# Patient Record
Sex: FEMALE | Race: WHITE | NOT HISPANIC OR LATINO | Employment: OTHER | URBAN - NONMETROPOLITAN AREA
[De-identification: names, ages, dates, MRNs, and addresses within clinical notes are randomized per-mention and may not be internally consistent; named-entity substitution may affect disease eponyms.]

---

## 2018-06-14 ENCOUNTER — OFFICE VISIT (OUTPATIENT)
Dept: OBSTETRICS AND GYNECOLOGY | Facility: CLINIC | Age: 48
End: 2018-06-14

## 2018-06-14 VITALS
SYSTOLIC BLOOD PRESSURE: 155 MMHG | HEIGHT: 65 IN | BODY MASS INDEX: 47.15 KG/M2 | WEIGHT: 283 LBS | DIASTOLIC BLOOD PRESSURE: 70 MMHG

## 2018-06-14 DIAGNOSIS — N85.2 ENLARGED UTERUS: Chronic | ICD-10-CM

## 2018-06-14 DIAGNOSIS — Z98.891 H/O CESAREAN SECTION: ICD-10-CM

## 2018-06-14 DIAGNOSIS — E66.01 MORBID OBESITY WITH BMI OF 40.0-44.9, ADULT (HCC): Chronic | ICD-10-CM

## 2018-06-14 DIAGNOSIS — N92.1 MENOMETRORRHAGIA: ICD-10-CM

## 2018-06-14 DIAGNOSIS — N80.9 ENDOMETRIOSIS: Primary | Chronic | ICD-10-CM

## 2018-06-14 PROCEDURE — 99204 OFFICE O/P NEW MOD 45 MIN: CPT | Performed by: OBSTETRICS & GYNECOLOGY

## 2018-06-14 RX ORDER — CITALOPRAM 40 MG/1
40 TABLET ORAL DAILY
COMMUNITY
Start: 2018-05-15

## 2018-06-14 RX ORDER — LOSARTAN POTASSIUM 50 MG/1
100 TABLET ORAL DAILY
COMMUNITY

## 2018-06-14 RX ORDER — ARIPIPRAZOLE 15 MG/1
15 TABLET ORAL DAILY
COMMUNITY
Start: 2018-05-15

## 2018-06-14 RX ORDER — ERGOCALCIFEROL 1.25 MG/1
50000 CAPSULE ORAL
COMMUNITY

## 2018-06-14 RX ORDER — CARISOPRODOL 350 MG/1
350 TABLET ORAL 3 TIMES DAILY PRN
COMMUNITY

## 2018-06-14 RX ORDER — CLONAZEPAM 0.5 MG/1
0.5 TABLET ORAL 2 TIMES DAILY PRN
COMMUNITY
End: 2018-10-25 | Stop reason: SDUPTHER

## 2018-06-14 RX ORDER — BUPROPION HYDROCHLORIDE 150 MG/1
150 TABLET, EXTENDED RELEASE ORAL 2 TIMES DAILY
Qty: 60 TABLET | Refills: 12 | Status: SHIPPED | OUTPATIENT
Start: 2018-06-14

## 2018-06-14 RX ORDER — LORATADINE 10 MG/1
10 TABLET ORAL DAILY
COMMUNITY
Start: 2018-03-14 | End: 2018-08-07

## 2018-06-14 RX ORDER — ESOMEPRAZOLE MAGNESIUM 40 MG/1
40 CAPSULE, DELAYED RELEASE ORAL 2 TIMES DAILY
COMMUNITY
End: 2019-05-21 | Stop reason: SDUPTHER

## 2018-06-14 RX ORDER — HYDROCODONE BITARTRATE AND ACETAMINOPHEN 10; 325 MG/1; MG/1
1 TABLET ORAL 3 TIMES DAILY PRN
COMMUNITY

## 2018-06-16 NOTE — PROGRESS NOTES
Rylie Palumbo is a 47 y.o. y/o female     Chief Complaint: Symptomatic endometriosis, metromenorrhagia, anemia, history of , lethargy, depression, and difficulty losing weight.    HPI: 47-year-old  with one prior  delivery, she has heavy and painful periods.  She has had a gastric ulcer in the past.  She has had to have a blood transfusion for blood loss secondary to a combination of the bleeding gastric ulcer and her extremely heavy periods.    Menarche at age 8.    Past medical history includes depression, thyroid nodule, endometriosis, morbid obesity, hypertension.    Past surgical history includes  section in , sinus surgery , cholecystectomy , hernia repair , carpal tunnel surgery in , rotator cuff repair .    In  she required transfusion of 5 units of blood secondary to a combination bleeding gastric ulcer and heavy menstrual periods    She has extremely heavy periods.  She has lots of pain with her periods and has pain that radiates down her legs.  She has two older sisters who have had hysterectomies.  She strongly desires definitive therapy with hysterectomy and BSO.    She has been told she has 14 nodules on her thyroid gland.  She is scheduled for a thyroid scan on 2018.    She complains of vaginal dryness and pain during intercourse.    Current medications include Celexa, Nexium, hydrocodone, somewhat, losartan, clonazepam, diltiazem, and Abilify.    She is .    She is disabled.    She does not currently smoke or use smokeless tobacco.  She quit smoking in .    Her mother  at age 65 of hyperthermia.  Her father  at age 63 of a heart attack.  She has a 62-year-old brother who has heart problems and is diabetic.  She has a 59 sister with diabetes with diabetes.  She has a 54-year-old sister with diabetes, lupus, and factor V deficiency.  There is no family history of breast cancer, uterine cancer, ovarian cancer, colon  cancer    She complains of being tired and weak with weight gain and low energy.    We discussed adding Wellbutrin, and she wishes to try this.  We discussed potential side effects of this medication.    We will schedule her for a pelvic ultrasound.    She strongly desires to be scheduled for hysterectomy with BSO.  She wishes to be scheduled for abdominal hysterectomy and bilateral salpingo-oophorectomy on July 27, 2018.    I spent 45 minutes in direct patient care with this patient.    Review of Systems   Constitutional: Positive for fatigue and unexpected weight change ( Weight gain). Negative for activity change, appetite change, chills, diaphoresis and fever.   Gastrointestinal: Positive for abdominal pain and constipation. Negative for diarrhea and nausea.   Genitourinary: Positive for dyspareunia, menstrual problem, pelvic pain and urgency. Negative for difficulty urinating, dysuria, vaginal bleeding, vaginal discharge and vaginal pain.   Neurological: Positive for headaches.   Psychiatric/Behavioral: Positive for dysphoric mood and sleep disturbance. The patient is nervous/anxious.    All other systems reviewed and are negative.     Breast ROS: negative    The following portions of the patient's history were reviewed and updated as appropriate: allergies, current medications, past family history, past medical history, past social history, past surgical history and problem list.    Allergies   Allergen Reactions   • Adhesive Tape Rash   • Other Rash     Metals,Surgical Glue          Current Outpatient Prescriptions:   •  ARIPiprazole (ABILIFY) 15 MG tablet, Take 15 mg by mouth Daily., Disp: , Rfl:   •  carisoprodol (SOMA) 350 MG tablet, Take 350 mg by mouth 4 (Four) Times a Day As Needed for Muscle Spasms., Disp: , Rfl:   •  citalopram (CeleXA) 40 MG tablet, Take 40 mg by mouth Daily., Disp: , Rfl:   •  clonazePAM (KlonoPIN) 0.5 MG tablet, Take 0.5 mg by mouth 2 (Two) Times a Day As Needed for Seizures.,  Disp: , Rfl:   •  esomeprazole (nexIUM) 40 MG capsule, Take 40 mg by mouth 2 (Two) Times a Day., Disp: , Rfl:   •  HYDROcodone-acetaminophen (NORCO)  MG per tablet, Take 1 tablet by mouth 3 (Three) Times a Day., Disp: , Rfl:   •  loratadine (CLARITIN) 10 MG tablet, Take 10 mg by mouth Daily., Disp: , Rfl:   •  losartan (COZAAR) 50 MG tablet, Take 50 mg by mouth Daily., Disp: , Rfl:   •  vitamin D (ERGOCALCIFEROL) 40632 units capsule capsule, Take 50,000 Units by mouth 1 (One) Time Per Week., Disp: , Rfl:   •  buPROPion SR (WELLBUTRIN SR) 150 MG 12 hr tablet, Take 1 tablet by mouth 2 (Two) Times a Day., Disp: 60 tablet, Rfl: 12     The patient has a family history of   Family History   Problem Relation Age of Onset   • Hypertension Father    • Diabetes Father    • Stroke Father    • Heart attack Father    • Diabetes Mother    • Malig Hyperthermia Mother    • Hypertension Mother    • Factor V Leiden deficiency Sister    • Lupus Sister    • Diabetes Sister         Past Medical History:   Diagnosis Date   • Anemia    • Anxiety    • Asthma    • Depression    • Endometriosis    • Enlarged uterus 2018   • H/O  section 2018   • Hypertension    • Menometrorrhagia 2018   • Morbid obesity with BMI of 40.0-44.9, adult 2018   • Ovarian cyst         OB History      Para Term  AB Living    3 1 1   2      SAB TAB Ectopic Molar Multiple Live Births    2                     Social History     Social History   • Marital status:      Spouse name: N/A   • Number of children: N/A   • Years of education: N/A     Occupational History   • Not on file.     Social History Main Topics   • Smoking status: Former Smoker     Quit date: 2012   • Smokeless tobacco: Never Used   • Alcohol use No   • Drug use: No   • Sexual activity: Not on file     Other Topics Concern   • Not on file     Social History Narrative   • No narrative on file        Past Surgical History:   Procedure Laterality  "Date   •  SECTION     • CHOLECYSTECTOMY      2009   • CUBITAL TUNNEL RELEASE Right 2008   • ENDOSCOPIC FUNCTIONAL SINUS SURGERY (FESS)  2009   • SHOULDER ROTATOR CUFF REPAIR Right 2016   • UMBILICAL HERNIA REPAIR      Laparscopic        Patient Active Problem List   Diagnosis   • Endometriosis   • H/O  section   • Morbid obesity with BMI of 40.0-44.9, adult   • Menometrorrhagia   • Enlarged uterus        Documented Vitals    18 1347   BP: 155/70   Weight: 128 kg (283 lb)   Height: 165.1 cm (65\")       Physical Exam   Constitutional: She is oriented to person, place, and time. No distress.   Morbidly obese white female weighing 283 pounds with BMI 47.1.   HENT:   Head: Normocephalic and atraumatic.   Eyes: Conjunctivae and EOM are normal. Pupils are equal, round, and reactive to light.   Neck: Normal range of motion. Neck supple. No JVD present. No tracheal deviation present. No thyromegaly present.   Cardiovascular: Normal rate, regular rhythm, normal heart sounds and intact distal pulses.  Exam reveals no gallop and no friction rub.    No murmur heard.  Pulmonary/Chest: Effort normal and breath sounds normal. No stridor. No respiratory distress. She has no wheezes. She has no rales. She exhibits no tenderness.   Abdominal: Soft. Bowel sounds are normal. She exhibits no distension and no mass. There is no tenderness. There is no rebound and no guarding. No hernia.   Musculoskeletal: Normal range of motion. She exhibits no edema, tenderness or deformity.   Lymphadenopathy:     She has no cervical adenopathy.   Neurological: She is alert and oriented to person, place, and time. She has normal reflexes. She displays normal reflexes. No cranial nerve deficit. She exhibits normal muscle tone. Coordination normal.   Skin: Skin is warm and dry. No rash noted. She is not diaphoretic. No erythema. No pallor.   Psychiatric: She has a normal mood and affect. Her behavior is normal. Judgment and " thought content normal.   Nursing note and vitals reviewed.       Assessment        Diagnosis Plan   1. Endometriosis     2. H/O  section     3. Morbid obesity with BMI of 40.0-44.9, adult     4. Menometrorrhagia     5. Enlarged uterus           Plan      1. Wellbutrin  mg by mouth twice a day.  2. Recommend vitamin D3 and B12 supplementation.  3. Pelvic ultrasound in [place].  4. Scheduled total abdominal hysterectomy and bilateral salpingo-oophorectomy for 2018.  5. Encouraged in diet and exercise.  6. Handouts on depression, hot flashes, exercise, and vitamin use.   7. Follow-up in 3 weeks.  Follow-up sooner as needed.            This document has been electronically signed by Orlando Starr MD on 2018 4:59 PM

## 2018-07-11 ENCOUNTER — OFFICE VISIT (OUTPATIENT)
Dept: OBSTETRICS AND GYNECOLOGY | Facility: CLINIC | Age: 48
End: 2018-07-11

## 2018-07-11 VITALS
SYSTOLIC BLOOD PRESSURE: 134 MMHG | DIASTOLIC BLOOD PRESSURE: 90 MMHG | BODY MASS INDEX: 46.98 KG/M2 | HEIGHT: 65 IN | WEIGHT: 282 LBS

## 2018-07-11 DIAGNOSIS — D25.0 INTRAMURAL, SUBMUCOUS, AND SUBSEROUS LEIOMYOMA OF UTERUS: Primary | ICD-10-CM

## 2018-07-11 DIAGNOSIS — N85.2 ENLARGED UTERUS: Chronic | ICD-10-CM

## 2018-07-11 DIAGNOSIS — N80.9 ENDOMETRIOSIS: Chronic | ICD-10-CM

## 2018-07-11 DIAGNOSIS — D25.1 INTRAMURAL, SUBMUCOUS, AND SUBSEROUS LEIOMYOMA OF UTERUS: Primary | ICD-10-CM

## 2018-07-11 DIAGNOSIS — Z98.891 H/O CESAREAN SECTION: ICD-10-CM

## 2018-07-11 DIAGNOSIS — D25.2 INTRAMURAL, SUBMUCOUS, AND SUBSEROUS LEIOMYOMA OF UTERUS: Primary | ICD-10-CM

## 2018-07-11 DIAGNOSIS — Z12.31 ENCOUNTER FOR SCREENING MAMMOGRAM FOR BREAST CANCER: ICD-10-CM

## 2018-07-11 DIAGNOSIS — E66.01 MORBID OBESITY WITH BMI OF 40.0-44.9, ADULT (HCC): Chronic | ICD-10-CM

## 2018-07-11 DIAGNOSIS — D50.0 IRON DEFICIENCY ANEMIA DUE TO CHRONIC BLOOD LOSS: Chronic | ICD-10-CM

## 2018-07-11 PROCEDURE — 99215 OFFICE O/P EST HI 40 MIN: CPT | Performed by: OBSTETRICS & GYNECOLOGY

## 2018-07-24 PROBLEM — D64.9 ANEMIA: Chronic | Status: ACTIVE | Noted: 2018-07-24

## 2018-07-24 PROBLEM — D25.1 INTRAMURAL, SUBMUCOUS, AND SUBSEROUS LEIOMYOMA OF UTERUS: Status: ACTIVE | Noted: 2018-07-24

## 2018-07-24 PROBLEM — D25.2 INTRAMURAL, SUBMUCOUS, AND SUBSEROUS LEIOMYOMA OF UTERUS: Status: ACTIVE | Noted: 2018-07-24

## 2018-07-24 PROBLEM — D25.0 INTRAMURAL, SUBMUCOUS, AND SUBSEROUS LEIOMYOMA OF UTERUS: Status: ACTIVE | Noted: 2018-07-24

## 2018-07-24 PROBLEM — D50.0 IRON DEFICIENCY ANEMIA DUE TO CHRONIC BLOOD LOSS: Status: ACTIVE | Noted: 2018-07-24

## 2018-07-24 RX ORDER — SODIUM CHLORIDE 0.9 % (FLUSH) 0.9 %
1-10 SYRINGE (ML) INJECTION AS NEEDED
Status: CANCELLED | OUTPATIENT
Start: 2018-08-10 | End: 2019-08-10

## 2018-07-24 RX ORDER — CELECOXIB 200 MG/1
400 CAPSULE ORAL ONCE
Status: CANCELLED | OUTPATIENT
Start: 2018-08-10 | End: 2018-08-10

## 2018-07-24 RX ORDER — CEFAZOLIN SODIUM IN 0.9 % NACL 3 G/100 ML
3 INTRAVENOUS SOLUTION, PIGGYBACK (ML) INTRAVENOUS ONCE
Status: CANCELLED | OUTPATIENT
Start: 2018-08-10 | End: 2018-08-10

## 2018-07-24 NOTE — PROGRESS NOTES
Rylie Palumbo is a 48 y.o. y/o female     Chief Complaint: Symptomatic fibroid uterus, endometriosis, metromenorrhagia, anemia, history of , lethargy, depression, and difficulty losing weight.     HPI: 48-year-old  with one prior  delivery, she has heavy and painful periods.  She has had a gastric ulcer in the past.  She has had to have a blood transfusion for blood loss secondary to a combination of the bleeding gastric ulcer and her extremely heavy periods.     Menarche at age 8.     Past medical history includes depression, thyroid nodule, endometriosis, morbid obesity, hypertension.     Past surgical history includes  section in , sinus surgery , cholecystectomy , hernia repair , carpal tunnel surgery in , rotator cuff repair .     In  she required transfusion of 5 units of blood secondary to a combination bleeding gastric ulcer and heavy menstrual periods     She has extremely heavy periods.  She has lots of pain with her periods and has pain that radiates down her legs.  She has two older sisters who have had hysterectomies.  She strongly desires definitive therapy with hysterectomy and BSO.     She has been told she has 14 nodules on her thyroid gland.  She is scheduled for a thyroid scan on 2018.     She complains of vaginal dryness and pain during intercourse.     Current medications include Celexa, Nexium, hydrocodone, somewhat, losartan, clonazepam, diltiazem, and Abilify.     She is .     She is disabled.     She does not currently smoke or use smokeless tobacco.  She quit smoking in .     Her mother  at age 65 of hyperthermia.  Her father  at age 63 of a heart attack.  She has a 62-year-old brother who has heart problems and is diabetic.  She has a 59 sister with diabetes with diabetes.  She has a 54-year-old sister with diabetes, lupus, and factor V deficiency.  There is no family history of breast cancer, uterine  cancer, ovarian cancer, colon cancer     She complains of being tired and weak with weight gain and low energy.     We discussed adding Wellbutrin, and she wishes to try this.  We discussed potential side effects of this medication.     Pelvic ultrasound June 19, 2018 shows an enlarged fibroid uterus.  A left lateral 2.7 cm fibroid appears to be subserosal and degenerating.  Endometrial thickness 5.1 mm.  Normal-appearing ovaries bilaterally     She strongly desires to be scheduled for hysterectomy with BSO.  She wishes to be scheduled for abdominal hysterectomy and bilateral salpingo-oophorectomy on August 3, 2018.  She voices understanding that her morbid obesity, prior abdominal surgery, and multiple medical problems significantly increase the risk of surgical complications, anesthesia complications, and complications with recovery.     I spent 45 minutes in direct patient care with this patient.    Review of Systems   Constitutional: Positive for fatigue. Negative for activity change, appetite change, chills, diaphoresis, fever and unexpected weight change.   Gastrointestinal: Positive for abdominal pain and constipation. Negative for diarrhea and nausea.   Genitourinary: Positive for dyspareunia and menstrual problem. Negative for difficulty urinating, dysuria, pelvic pain, urgency, vaginal bleeding, vaginal discharge and vaginal pain.   Neurological: Positive for headaches.   Psychiatric/Behavioral: Positive for dysphoric mood and sleep disturbance. The patient is nervous/anxious.    All other systems reviewed and are negative.     Breast ROS: negative    The following portions of the patient's history were reviewed and updated as appropriate: allergies, current medications, past family history, past medical history, past social history, past surgical history and problem list.    Allergies   Allergen Reactions   • Adhesive Tape Rash   • Other Rash     Metals,Surgical Glue          Current Outpatient  Prescriptions:   •  ARIPiprazole (ABILIFY) 15 MG tablet, Take 15 mg by mouth Daily., Disp: , Rfl:   •  buPROPion SR (WELLBUTRIN SR) 150 MG 12 hr tablet, Take 1 tablet by mouth 2 (Two) Times a Day., Disp: 60 tablet, Rfl: 12  •  carisoprodol (SOMA) 350 MG tablet, Take 350 mg by mouth 4 (Four) Times a Day As Needed for Muscle Spasms., Disp: , Rfl:   •  citalopram (CeleXA) 40 MG tablet, Take 40 mg by mouth Daily., Disp: , Rfl:   •  clonazePAM (KlonoPIN) 0.5 MG tablet, Take 0.5 mg by mouth 2 (Two) Times a Day As Needed for Seizures., Disp: , Rfl:   •  esomeprazole (nexIUM) 40 MG capsule, Take 40 mg by mouth 2 (Two) Times a Day., Disp: , Rfl:   •  HYDROcodone-acetaminophen (NORCO)  MG per tablet, Take 1 tablet by mouth 3 (Three) Times a Day., Disp: , Rfl:   •  loratadine (CLARITIN) 10 MG tablet, Take 10 mg by mouth Daily., Disp: , Rfl:   •  losartan (COZAAR) 50 MG tablet, Take 50 mg by mouth Daily., Disp: , Rfl:   •  vitamin D (ERGOCALCIFEROL) 28471 units capsule capsule, Take 50,000 Units by mouth 1 (One) Time Per Week., Disp: , Rfl:      The patient has a family history of   Family History   Problem Relation Age of Onset   • Hypertension Father    • Diabetes Father    • Stroke Father    • Heart attack Father    • Diabetes Mother    • Malig Hyperthermia Mother    • Hypertension Mother    • Factor V Leiden deficiency Sister    • Lupus Sister    • Diabetes Sister         Past Medical History:   Diagnosis Date   • Anemia    • Anxiety    • Asthma    • Depression    • Endometriosis    • Enlarged uterus 2018   • H/O  section 2018   • Hypertension    • Intramural, submucous, and subserous leiomyoma of uterus 2018   • Menometrorrhagia 2018   • Morbid obesity with BMI of 40.0-44.9, adult (CMS/HCC) 2018   • Ovarian cyst         OB History      Para Term  AB Living    3 1 1   2      SAB TAB Ectopic Molar Multiple Live Births    2                     Social History     Social  "History   • Marital status:      Spouse name: N/A   • Number of children: N/A   • Years of education: N/A     Occupational History   • Not on file.     Social History Main Topics   • Smoking status: Former Smoker     Quit date: 2012   • Smokeless tobacco: Never Used   • Alcohol use No   • Drug use: No   • Sexual activity: Not on file     Other Topics Concern   • Not on file     Social History Narrative   • No narrative on file        Past Surgical History:   Procedure Laterality Date   •  SECTION     • CHOLECYSTECTOMY      2009   • CUBITAL TUNNEL RELEASE Right 2008   • ENDOSCOPIC FUNCTIONAL SINUS SURGERY (FESS)  2009   • SHOULDER ROTATOR CUFF REPAIR Right 2016   • UMBILICAL HERNIA REPAIR      Laparscopic        Patient Active Problem List   Diagnosis   • Endometriosis   • H/O  section   • Morbid obesity with BMI of 40.0-44.9, adult (CMS/[facility])   • Menometrorrhagia   • Enlarged uterus   • Intramural, submucous, and subserous leiomyoma of uterus   • Anemia        Documented Vitals    18 1441   BP: 134/90   Weight: 128 kg (282 lb)   Height: 165.1 cm (65\")       Physical Exam   Constitutional: She is oriented to person, place, and time. No distress.   Morbidly obese white female weighing 282 pounds with BMI 46.9.   HENT:   Head: Normocephalic and atraumatic.   Eyes: Pupils are equal, round, and reactive to light. Conjunctivae and EOM are normal.   Neck: Normal range of motion. Neck supple. No JVD present. No tracheal deviation present. No thyromegaly present.   Cardiovascular: Normal rate, regular rhythm, normal heart sounds and intact distal pulses.  Exam reveals no gallop and no friction rub.    No murmur heard.  Pulmonary/Chest: Effort normal and breath sounds normal. No stridor. No respiratory distress. She has no wheezes. She has no rales. She exhibits no tenderness.   Abdominal: Soft. Bowel sounds are normal. She exhibits no distension and no mass. There is no tenderness. There " is no rebound and no guarding. No hernia. Hernia confirmed negative in the right inguinal area and confirmed negative in the left inguinal area.   Genitourinary: Rectal exam shows no external hemorrhoid, no internal hemorrhoid, no fissure, no mass, no tenderness, anal tone normal and guaiac negative stool. No labial fusion. There is no rash, tenderness, lesion or injury on the right labia. There is no rash, tenderness, lesion or injury on the left labia. Uterus is enlarged and tender. Cervix exhibits no motion tenderness, no discharge and no friability. Right adnexum displays no mass, no tenderness and no fullness. Left adnexum displays no mass, no tenderness and no fullness. No erythema, tenderness or bleeding in the vagina. No foreign body in the vagina. No signs of injury around the vagina. No vaginal discharge found.   Genitourinary Comments: Enlarged, irregularly shaped, and tender uterus.  Uterosacral nodularity bilaterally.  No uterine descent.   Musculoskeletal: Normal range of motion. She exhibits no edema, tenderness or deformity.   Lymphadenopathy:     She has no cervical adenopathy.        Right: No inguinal adenopathy present.        Left: No inguinal adenopathy present.   Neurological: She is alert and oriented to person, place, and time. She has normal reflexes. She displays normal reflexes. No cranial nerve deficit. She exhibits normal muscle tone. Coordination normal.   Skin: Skin is warm and dry. No rash noted. She is not diaphoretic. No erythema. No pallor.   Psychiatric: She has a normal mood and affect. Her behavior is normal. Judgment and thought content normal.   Nursing note and vitals reviewed.    She was consented for ABDOMINAL HYSTERECTOMY WITH BILATERAL SALPINGO-OPHORECTOMY AND POSSIBLE APPENDECTOMY  We discussed the risk of no surgery to include no improvement and possible, although unlikely, undiagnosed malignancy.  The risks that were discussed included, but were not limited to:    1.  Bleeding with possible risk of blood transfusion. Blood products carry risk of HIV, infection, hepatitis, and transfusion reaction.    2. Infection requiring a antibiotic therapy.    3. Damage to internal organs such as bowel, bladder, blood vessels, or a hole(fistula) bladder and vagina or rectum and vagina requiring further surgical repair.    4. Anesthetic risk to include death.    5. Intolerance to hormone replacement therapy with risk of osteoporosis and cardiovascular disease.    6. Risk of postsurgical depression or sexual dysfunction after removal of pelvic organs.    7. Small risk for deep vein thrombosis were all explained.     8. The small risk for reoperation in the event of unanticipated bleeding or surgical injury was discussed.     9. Risk of continued pain.   10. Risk of malignancy with possible need for further surgery and/or therapy.  11. Death.   She voices understanding that her morbid obesity, prior abdominal surgery, and multiple medical problems significantly increase the risk of surgical complications, anesthesia complications, and complications with recovery.  All of her questions were answered fully. She left with a very clear understanding of the preoperative surgical indications and the nature of the surgery for which she is scheduled. She understands to be NPO after midnight.          Assessment        Diagnosis Plan   1. Intramural, submucous, and subserous leiomyoma of uterus     2. Endometriosis     3. H/O  section     4. Iron deficiency anemia due to chronic blood loss     5. Morbid obesity with BMI of 40.0-44.9, adult (CMS/HCC)     6. Enlarged uterus     7. Encounter for screening mammogram for breast cancer  Mammo Screening Digital Tomosynthesis Bilateral With CAD         Plan      1. Scheduled abdominal hysterectomy and bilateral salpingo-oophorectomy and possible appendectomy for August 3, 2018.  2. Schedule preadmission testing.            This document has been  electronically signed by Orlando Starr MD on July 24, 2018 8:06 AM

## 2018-07-24 NOTE — H&P
SURGICAL HISTORY AND PHYSICAL    Rylie Palumbo is a 48 y.o. y/o female     Chief Complaint: Symptomatic fibroid uterus, endometriosis, metromenorrhagia, anemia, history of , lethargy, depression, and difficulty losing weight.     HPI: 48-year-old  with one prior  delivery, she has heavy and painful periods.  She has had a gastric ulcer in the past.  She has had to have a blood transfusion for blood loss secondary to a combination of the bleeding gastric ulcer and her extremely heavy periods.     Menarche at age 8.     Past medical history includes depression, thyroid nodule, endometriosis, morbid obesity, hypertension.     Past surgical history includes  section in , sinus surgery , cholecystectomy , hernia repair , carpal tunnel surgery in , rotator cuff repair .     In  she required transfusion of 5 units of blood secondary to a combination bleeding gastric ulcer and heavy menstrual periods     She has extremely heavy periods.  She has lots of pain with her periods and has pain that radiates down her legs.  She has two older sisters who have had hysterectomies.  She strongly desires definitive therapy with hysterectomy and BSO.     She has been told she has 14 nodules on her thyroid gland.  She is scheduled for a thyroid scan on 2018.     She complains of vaginal dryness and pain during intercourse.     Current medications include Celexa, Nexium, hydrocodone, somewhat, losartan, clonazepam, diltiazem, and Abilify.     She is .     She is disabled.     She does not currently smoke or use smokeless tobacco.  She quit smoking in .     Her mother  at age 65 of hyperthermia.  Her father  at age 63 of a heart attack.  She has a 62-year-old brother who has heart problems and is diabetic.  She has a 59 sister with diabetes with diabetes.  She has a 54-year-old sister with diabetes, lupus, and factor V deficiency.  There is no family  history of breast cancer, uterine cancer, ovarian cancer, colon cancer     She complains of being tired and weak with weight gain and low energy.     We discussed adding Wellbutrin, and she wishes to try this.  We discussed potential side effects of this medication.     Pelvic ultrasound June 19, 2018 shows an enlarged fibroid uterus.  A left lateral 2.7 cm fibroid appears to be subserosal and degenerating.  Endometrial thickness 5.1 mm.  Normal-appearing ovaries bilaterally     She strongly desires to be scheduled for hysterectomy with BSO.  She wishes to be scheduled for abdominal hysterectomy and bilateral salpingo-oophorectomy on August 3, 2018.  She voices understanding that her morbid obesity, prior abdominal surgery, and multiple medical problems significantly increase the risk of surgical complications, anesthesia complications, and complications with recovery.     I spent 45 minutes in direct patient care with this patient.    Review of Systems   Constitutional: Positive for fatigue. Negative for activity change, appetite change, chills, diaphoresis, fever and unexpected weight change.   Gastrointestinal: Positive for abdominal pain and constipation. Negative for diarrhea and nausea.   Genitourinary: Positive for dyspareunia and menstrual problem. Negative for difficulty urinating, dysuria, pelvic pain, urgency, vaginal bleeding, vaginal discharge and vaginal pain.   Neurological: Positive for headaches.   Psychiatric/Behavioral: Positive for dysphoric mood and sleep disturbance. The patient is nervous/anxious.    All other systems reviewed and are negative.     Breast ROS: negative    The following portions of the patient's history were reviewed and updated as appropriate: allergies, current medications, past family history, past medical history, past social history, past surgical history and problem list.    Allergies   Allergen Reactions   • Adhesive Tape Rash   • Other Rash     Metals,Surgical Glue           Current Outpatient Prescriptions:   •  ARIPiprazole (ABILIFY) 15 MG tablet, Take 15 mg by mouth Daily., Disp: , Rfl:   •  buPROPion SR (WELLBUTRIN SR) 150 MG 12 hr tablet, Take 1 tablet by mouth 2 (Two) Times a Day., Disp: 60 tablet, Rfl: 12  •  carisoprodol (SOMA) 350 MG tablet, Take 350 mg by mouth 4 (Four) Times a Day As Needed for Muscle Spasms., Disp: , Rfl:   •  citalopram (CeleXA) 40 MG tablet, Take 40 mg by mouth Daily., Disp: , Rfl:   •  clonazePAM (KlonoPIN) 0.5 MG tablet, Take 0.5 mg by mouth 2 (Two) Times a Day As Needed for Seizures., Disp: , Rfl:   •  esomeprazole (nexIUM) 40 MG capsule, Take 40 mg by mouth 2 (Two) Times a Day., Disp: , Rfl:   •  HYDROcodone-acetaminophen (NORCO)  MG per tablet, Take 1 tablet by mouth 3 (Three) Times a Day., Disp: , Rfl:   •  loratadine (CLARITIN) 10 MG tablet, Take 10 mg by mouth Daily., Disp: , Rfl:   •  losartan (COZAAR) 50 MG tablet, Take 50 mg by mouth Daily., Disp: , Rfl:   •  vitamin D (ERGOCALCIFEROL) 61536 units capsule capsule, Take 50,000 Units by mouth 1 (One) Time Per Week., Disp: , Rfl:      The patient has a family history of   Family History   Problem Relation Age of Onset   • Hypertension Father    • Diabetes Father    • Stroke Father    • Heart attack Father    • Diabetes Mother    • Malig Hyperthermia Mother    • Hypertension Mother    • Factor V Leiden deficiency Sister    • Lupus Sister    • Diabetes Sister         Past Medical History:   Diagnosis Date   • Anemia    • Anxiety    • Asthma    • Depression    • Endometriosis    • Enlarged uterus 2018   • H/O  section 2018   • Hypertension    • Intramural, submucous, and subserous leiomyoma of uterus 2018   • Menometrorrhagia 2018   • Morbid obesity with BMI of 40.0-44.9, adult (CMS/[facility]) 2018   • Ovarian cyst         OB History      Para Term  AB Living    3 1 1   2      SAB TAB Ectopic Molar Multiple Live Births    2                  "    Social History     Social History   • Marital status:      Spouse name: N/A   • Number of children: N/A   • Years of education: N/A     Occupational History   • Not on file.     Social History Main Topics   • Smoking status: Former Smoker     Quit date: 2012   • Smokeless tobacco: Never Used   • Alcohol use No   • Drug use: No   • Sexual activity: Not on file     Other Topics Concern   • Not on file     Social History Narrative   • No narrative on file        Past Surgical History:   Procedure Laterality Date   •  SECTION     • CHOLECYSTECTOMY      2009   • CUBITAL TUNNEL RELEASE Right 2008   • ENDOSCOPIC FUNCTIONAL SINUS SURGERY (FESS)  2009   • SHOULDER ROTATOR CUFF REPAIR Right 2016   • UMBILICAL HERNIA REPAIR      Laparscopic        Patient Active Problem List   Diagnosis   • Endometriosis   • H/O  section   • Morbid obesity with BMI of 40.0-44.9, adult (CMS/[facility])   • Menometrorrhagia   • Enlarged uterus   • Intramural, submucous, and subserous leiomyoma of uterus   • Anemia        Documented Vitals    18 1441   BP: 134/90   Weight: 128 kg (282 lb)   Height: 165.1 cm (65\")       Physical Exam   Constitutional: She is oriented to person, place, and time. No distress.   Morbidly obese white female weighing 282 pounds with BMI 46.9.   HENT:   Head: Normocephalic and atraumatic.   Eyes: Pupils are equal, round, and reactive to light. Conjunctivae and EOM are normal.   Neck: Normal range of motion. Neck supple. No JVD present. No tracheal deviation present. No thyromegaly present.   Cardiovascular: Normal rate, regular rhythm, normal heart sounds and intact distal pulses.  Exam reveals no gallop and no friction rub.    No murmur heard.  Pulmonary/Chest: Effort normal and breath sounds normal. No stridor. No respiratory distress. She has no wheezes. She has no rales. She exhibits no tenderness.   Abdominal: Soft. Bowel sounds are normal. She exhibits no distension and no mass. " There is no tenderness. There is no rebound and no guarding. No hernia. Hernia confirmed negative in the right inguinal area and confirmed negative in the left inguinal area.   Genitourinary: Rectal exam shows no external hemorrhoid, no internal hemorrhoid, no fissure, no mass, no tenderness, anal tone normal and guaiac negative stool. No labial fusion. There is no rash, tenderness, lesion or injury on the right labia. There is no rash, tenderness, lesion or injury on the left labia. Uterus is enlarged and tender. Cervix exhibits no motion tenderness, no discharge and no friability. Right adnexum displays no mass, no tenderness and no fullness. Left adnexum displays no mass, no tenderness and no fullness. No erythema, tenderness or bleeding in the vagina. No foreign body in the vagina. No signs of injury around the vagina. No vaginal discharge found.   Genitourinary Comments: Enlarged, irregularly shaped, and tender uterus.  Uterosacral nodularity bilaterally.  No uterine descent.   Musculoskeletal: Normal range of motion. She exhibits no edema, tenderness or deformity.   Lymphadenopathy:     She has no cervical adenopathy.        Right: No inguinal adenopathy present.        Left: No inguinal adenopathy present.   Neurological: She is alert and oriented to person, place, and time. She has normal reflexes. She displays normal reflexes. No cranial nerve deficit. She exhibits normal muscle tone. Coordination normal.   Skin: Skin is warm and dry. No rash noted. She is not diaphoretic. No erythema. No pallor.   Psychiatric: She has a normal mood and affect. Her behavior is normal. Judgment and thought content normal.   Nursing note and vitals reviewed.    She was consented for ABDOMINAL HYSTERECTOMY WITH BILATERAL SALPINGO-OPHORECTOMY AND POSSIBLE APPENDECTOMY  We discussed the risk of no surgery to include no improvement and possible, although unlikely, undiagnosed malignancy.  The risks that were discussed included,  but were not limited to:    1. Bleeding with possible risk of blood transfusion. Blood products carry risk of HIV, infection, hepatitis, and transfusion reaction.    2. Infection requiring a antibiotic therapy.    3. Damage to internal organs such as bowel, bladder, blood vessels, or a hole(fistula) bladder and vagina or rectum and vagina requiring further surgical repair.    4. Anesthetic risk to include death.    5. Intolerance to hormone replacement therapy with risk of osteoporosis and cardiovascular disease.    6. Risk of postsurgical depression or sexual dysfunction after removal of pelvic organs.    7. Small risk for deep vein thrombosis were all explained.     8. The small risk for reoperation in the event of unanticipated bleeding or surgical injury was discussed.     9. Risk of continued pain.   10. Risk of malignancy with possible need for further surgery and/or therapy.  11. Death.   She voices understanding that her morbid obesity, prior abdominal surgery, and multiple medical problems significantly increase the risk of surgical complications, anesthesia complications, and complications with recovery.  All of her questions were answered fully. She left with a very clear understanding of the preoperative surgical indications and the nature of the surgery for which she is scheduled. She understands to be NPO after midnight.          Assessment        Diagnosis Plan   1. Intramural, submucous, and subserous leiomyoma of uterus     2. Endometriosis     3. H/O  section     4. Iron deficiency anemia due to chronic blood loss     5. Morbid obesity with BMI of 40.0-44.9, adult (CMS/HCC)     6. Enlarged uterus     7. Encounter for screening mammogram for breast cancer  Mammo Screening Digital Tomosynthesis Bilateral With CAD         Plan      1. Scheduled abdominal hysterectomy and bilateral salpingo-oophorectomy and possible appendectomy for August 3, 2018.  2. Schedule preadmission  testing.            This document has been electronically signed by Orlando Starr MD on July 24, 2018 8:06 AM

## 2018-08-07 ENCOUNTER — APPOINTMENT (OUTPATIENT)
Dept: PREADMISSION TESTING | Facility: HOSPITAL | Age: 48
End: 2018-08-07

## 2018-08-07 VITALS
WEIGHT: 279 LBS | BODY MASS INDEX: 46.48 KG/M2 | RESPIRATION RATE: 20 BRPM | HEIGHT: 65 IN | HEART RATE: 76 BPM | OXYGEN SATURATION: 98 % | DIASTOLIC BLOOD PRESSURE: 78 MMHG | SYSTOLIC BLOOD PRESSURE: 162 MMHG

## 2018-08-07 DIAGNOSIS — D50.0 IRON DEFICIENCY ANEMIA DUE TO CHRONIC BLOOD LOSS: ICD-10-CM

## 2018-08-07 DIAGNOSIS — N80.9 ENDOMETRIOSIS: ICD-10-CM

## 2018-08-07 DIAGNOSIS — D25.1 INTRAMURAL, SUBMUCOUS, AND SUBSEROUS LEIOMYOMA OF UTERUS: ICD-10-CM

## 2018-08-07 DIAGNOSIS — E66.01 MORBID OBESITY WITH BMI OF 40.0-44.9, ADULT (HCC): ICD-10-CM

## 2018-08-07 DIAGNOSIS — D25.2 INTRAMURAL, SUBMUCOUS, AND SUBSEROUS LEIOMYOMA OF UTERUS: ICD-10-CM

## 2018-08-07 DIAGNOSIS — Z98.891 H/O CESAREAN SECTION: ICD-10-CM

## 2018-08-07 DIAGNOSIS — D25.0 INTRAMURAL, SUBMUCOUS, AND SUBSEROUS LEIOMYOMA OF UTERUS: ICD-10-CM

## 2018-08-07 LAB
ABO GROUP BLD: NORMAL
ANISOCYTOSIS BLD QL: NORMAL
BACTERIA UR QL AUTO: ABNORMAL /HPF
BASOPHILS # BLD AUTO: 0.02 10*3/MM3 (ref 0–0.2)
BASOPHILS NFR BLD AUTO: 0.3 % (ref 0–2)
BILIRUB UR QL STRIP: NEGATIVE
BLD GP AB SCN SERPL QL: NEGATIVE
CLARITY UR: ABNORMAL
COLOR UR: YELLOW
DEPRECATED RDW RBC AUTO: 50.7 FL (ref 36.4–46.3)
EOSINOPHIL # BLD AUTO: 0.44 10*3/MM3 (ref 0–0.7)
EOSINOPHIL NFR BLD AUTO: 6.5 % (ref 0–7)
ERYTHROCYTE [DISTWIDTH] IN BLOOD BY AUTOMATED COUNT: 19.4 % (ref 11.5–14.5)
GLUCOSE UR STRIP-MCNC: NEGATIVE MG/DL
HCT VFR BLD AUTO: 40.3 % (ref 35–45)
HGB BLD-MCNC: 12.4 G/DL (ref 12–15.5)
HGB UR QL STRIP.AUTO: NEGATIVE
HYALINE CASTS UR QL AUTO: ABNORMAL /LPF
IMM GRANULOCYTES # BLD: 0.02 10*3/MM3 (ref 0–0.02)
IMM GRANULOCYTES NFR BLD: 0.3 % (ref 0–0.5)
KETONES UR QL STRIP: NEGATIVE
LEUKOCYTE ESTERASE UR QL STRIP.AUTO: ABNORMAL
LYMPHOCYTES # BLD AUTO: 1.27 10*3/MM3 (ref 0.6–4.2)
LYMPHOCYTES NFR BLD AUTO: 18.8 % (ref 10–50)
Lab: NORMAL
MCH RBC QN AUTO: 21.9 PG (ref 26.5–34)
MCHC RBC AUTO-ENTMCNC: 30.8 G/DL (ref 31.4–36)
MCV RBC AUTO: 71.2 FL (ref 80–98)
MICROCYTES BLD QL: NORMAL
MONOCYTES # BLD AUTO: 0.36 10*3/MM3 (ref 0–0.9)
MONOCYTES NFR BLD AUTO: 5.3 % (ref 0–12)
NEUTROPHILS # BLD AUTO: 4.64 10*3/MM3 (ref 2–8.6)
NEUTROPHILS NFR BLD AUTO: 68.8 % (ref 37–80)
NITRITE UR QL STRIP: NEGATIVE
OVALOCYTES BLD QL SMEAR: NORMAL
PH UR STRIP.AUTO: 5.5 [PH] (ref 5–9)
PLAT MORPH BLD: NORMAL
PLATELET # BLD AUTO: 158 10*3/MM3 (ref 150–450)
PMV BLD AUTO: ABNORMAL FL (ref 8–12)
POLYCHROMASIA BLD QL SMEAR: NORMAL
PROT UR QL STRIP: NEGATIVE
RBC # BLD AUTO: 5.66 10*6/MM3 (ref 3.77–5.16)
RBC # UR: ABNORMAL /HPF
REF LAB TEST METHOD: ABNORMAL
RH BLD: POSITIVE
SP GR UR STRIP: 1.01 (ref 1–1.03)
SQUAMOUS #/AREA URNS HPF: ABNORMAL /HPF
T&S EXPIRATION DATE: NORMAL
UROBILINOGEN UR QL STRIP: ABNORMAL
WBC MORPH BLD: NORMAL
WBC NRBC COR # BLD: 6.75 10*3/MM3 (ref 3.2–9.8)
WBC UR QL AUTO: ABNORMAL /HPF

## 2018-08-07 PROCEDURE — 36415 COLL VENOUS BLD VENIPUNCTURE: CPT

## 2018-08-07 PROCEDURE — 86900 BLOOD TYPING SEROLOGIC ABO: CPT | Performed by: OBSTETRICS & GYNECOLOGY

## 2018-08-07 PROCEDURE — 87086 URINE CULTURE/COLONY COUNT: CPT | Performed by: OBSTETRICS & GYNECOLOGY

## 2018-08-07 PROCEDURE — 85007 BL SMEAR W/DIFF WBC COUNT: CPT | Performed by: OBSTETRICS & GYNECOLOGY

## 2018-08-07 PROCEDURE — 81001 URINALYSIS AUTO W/SCOPE: CPT | Performed by: OBSTETRICS & GYNECOLOGY

## 2018-08-07 PROCEDURE — 93005 ELECTROCARDIOGRAM TRACING: CPT

## 2018-08-07 PROCEDURE — 86901 BLOOD TYPING SEROLOGIC RH(D): CPT | Performed by: OBSTETRICS & GYNECOLOGY

## 2018-08-07 PROCEDURE — 93010 ELECTROCARDIOGRAM REPORT: CPT | Performed by: INTERNAL MEDICINE

## 2018-08-07 PROCEDURE — 86850 RBC ANTIBODY SCREEN: CPT | Performed by: OBSTETRICS & GYNECOLOGY

## 2018-08-07 PROCEDURE — 85025 COMPLETE CBC W/AUTO DIFF WBC: CPT | Performed by: OBSTETRICS & GYNECOLOGY

## 2018-08-07 RX ORDER — SODIUM CHLORIDE, SODIUM GLUCONATE, SODIUM ACETATE, POTASSIUM CHLORIDE, AND MAGNESIUM CHLORIDE 526; 502; 368; 37; 30 MG/100ML; MG/100ML; MG/100ML; MG/100ML; MG/100ML
1000 INJECTION, SOLUTION INTRAVENOUS CONTINUOUS
Status: CANCELLED | OUTPATIENT
Start: 2018-08-10

## 2018-08-07 RX ORDER — FERROUS SULFATE TAB EC 324 MG (65 MG FE EQUIVALENT) 324 (65 FE) MG
324 TABLET DELAYED RESPONSE ORAL
COMMUNITY

## 2018-08-07 NOTE — DISCHARGE INSTRUCTIONS
[facility]  Pre-op Information and Guidelines    You will be called after 2 p.m. the day before your surgery (Friday for Monday surgery) and notified of your time for arrival and approximate surgery time.  If you have not received a call by 4P.M., please contact Same Day Surgery at [phone] of if outside [facility] call [phone].    Please Follow these Important Safety Guidelines:    • The morning of your procedure, take only the medications listed below with   A sip of water:_____________________________________________       ______________________________________________    • DO NOT eat or drink anything after 12:00 midnight the night before surgery  Specific instructions concerning drinking clear liquids will be discussed during  the pre-surgery instruction call the day before your surgery.    • If you take a blood thinner (ex. Plavix, Coumadin, aspirin), ask your doctor when to stop it before surgery  STOP DATE: _________________    • Only 2 visitors are allowed in patient rooms at a time  Your visitors will be asked to wait in the lobby until the admission process is complete with the exception of a parent with a child and patients in need of special assistance.    • YOU CANNOT DRIVE YOURSELF HOME  You must be accompanied by someone who will be responsible for driving you home after surgery and for your care at home.    • DO NOT chew gum, use breath mints, hard candy, or smoke the day of surgery  • DO NOT drink alcohol for at least 24 hours before your surgery  • DO NOT wear any jewelry and remove all body piercing before coming to the hospital  • DO NOT wear make-up to the hospital  • If you are having surgery on an extremity (arm/leg/foot) remove nail polish/artificial nails on the surgical side  • Clothing, glasses, contacts, dentures, and hairpieces must be removed before surgery  • Bathe the night before or the morning of your surgery and do not use powders/lotions on  skin.

## 2018-08-07 NOTE — PAT
Called Dr. Valladares chart flags patient as potential risk for malignant hyperthermia.  In md notes has patients mother as having hx of malignant hyperthermia, patient relates that her mother had her thyroid taken out and did not take her thyroid medicine and she was always cold, patient relates when her mother  they were told it was as if she  from hypothermia, patient unaware of any other issues with her mother.  Patient relates that the only problem she had with surgery is 1989 she ran fever for 4-5 days after a  and they thought it was infection related not surgery related according to patient.  No further orders or instructions at this time.  Chlorhexidine given with written and verbal instructions, reviewed with patient all questions answered, patient related that she understood information given.

## 2018-08-09 LAB — BACTERIA SPEC AEROBE CULT: NORMAL

## 2018-08-10 ENCOUNTER — ANESTHESIA (OUTPATIENT)
Dept: PERIOP | Facility: HOSPITAL | Age: 48
End: 2018-08-10

## 2018-08-10 ENCOUNTER — HOSPITAL ENCOUNTER (INPATIENT)
Facility: HOSPITAL | Age: 48
LOS: 2 days | Discharge: HOME | End: 2018-08-12
Attending: OBSTETRICS & GYNECOLOGY | Admitting: OBSTETRICS & GYNECOLOGY

## 2018-08-10 ENCOUNTER — ANESTHESIA EVENT (OUTPATIENT)
Dept: PERIOP | Facility: HOSPITAL | Age: 48
End: 2018-08-10

## 2018-08-10 DIAGNOSIS — Z98.891 H/O CESAREAN SECTION: ICD-10-CM

## 2018-08-10 DIAGNOSIS — G89.18 POSTOPERATIVE PAIN: Primary | ICD-10-CM

## 2018-08-10 DIAGNOSIS — E66.01 MORBID OBESITY WITH BMI OF 40.0-44.9, ADULT (HCC): ICD-10-CM

## 2018-08-10 DIAGNOSIS — N80.9 ENDOMETRIOSIS: ICD-10-CM

## 2018-08-10 DIAGNOSIS — D25.1 INTRAMURAL, SUBMUCOUS, AND SUBSEROUS LEIOMYOMA OF UTERUS: ICD-10-CM

## 2018-08-10 DIAGNOSIS — D25.2 INTRAMURAL, SUBMUCOUS, AND SUBSEROUS LEIOMYOMA OF UTERUS: ICD-10-CM

## 2018-08-10 DIAGNOSIS — D50.0 IRON DEFICIENCY ANEMIA DUE TO CHRONIC BLOOD LOSS: ICD-10-CM

## 2018-08-10 DIAGNOSIS — D25.0 INTRAMURAL, SUBMUCOUS, AND SUBSEROUS LEIOMYOMA OF UTERUS: ICD-10-CM

## 2018-08-10 LAB
ABO GROUP BLD: NORMAL
B-HCG UR QL: NEGATIVE
BLD GP AB SCN SERPL QL: NEGATIVE
Lab: NORMAL
RH BLD: POSITIVE
T&S EXPIRATION DATE: NORMAL

## 2018-08-10 PROCEDURE — 0UT70ZZ RESECTION OF BILATERAL FALLOPIAN TUBES, OPEN APPROACH: ICD-10-PCS | Performed by: OBSTETRICS & GYNECOLOGY

## 2018-08-10 PROCEDURE — 25010000002 SUCCINYLCHOLINE PER 20 MG: Performed by: NURSE ANESTHETIST, CERTIFIED REGISTERED

## 2018-08-10 PROCEDURE — 25010000002 ONDANSETRON PER 1 MG: Performed by: OBSTETRICS & GYNECOLOGY

## 2018-08-10 PROCEDURE — 25010000002 ESTRADIOL VALERATE PER 10 MG: Performed by: OBSTETRICS & GYNECOLOGY

## 2018-08-10 PROCEDURE — 81025 URINE PREGNANCY TEST: CPT | Performed by: OBSTETRICS & GYNECOLOGY

## 2018-08-10 PROCEDURE — 25010000002 METHOCARBAMOL 1000 MG/10ML SOLUTION 10 ML VIAL: Performed by: OBSTETRICS & GYNECOLOGY

## 2018-08-10 PROCEDURE — 0UT90ZZ RESECTION OF UTERUS, OPEN APPROACH: ICD-10-PCS | Performed by: OBSTETRICS & GYNECOLOGY

## 2018-08-10 PROCEDURE — 25010000002 TESTOSTERONE CYPIONATE 200 MG/ML SOLUTION: Performed by: OBSTETRICS & GYNECOLOGY

## 2018-08-10 PROCEDURE — 88307 TISSUE EXAM BY PATHOLOGIST: CPT | Performed by: PATHOLOGY

## 2018-08-10 PROCEDURE — 86923 COMPATIBILITY TEST ELECTRIC: CPT

## 2018-08-10 PROCEDURE — 86901 BLOOD TYPING SEROLOGIC RH(D): CPT | Performed by: OBSTETRICS & GYNECOLOGY

## 2018-08-10 PROCEDURE — 25010000002 FENTANYL CITRATE (PF) 100 MCG/2ML SOLUTION: Performed by: NURSE ANESTHETIST, CERTIFIED REGISTERED

## 2018-08-10 PROCEDURE — 25010000002 CEFAZOLIN PER 500 MG: Performed by: OBSTETRICS & GYNECOLOGY

## 2018-08-10 PROCEDURE — 94799 UNLISTED PULMONARY SVC/PX: CPT

## 2018-08-10 PROCEDURE — 25010000002 HYDROMORPHONE PER 4 MG: Performed by: NURSE ANESTHETIST, CERTIFIED REGISTERED

## 2018-08-10 PROCEDURE — 25010000002 ONDANSETRON PER 1 MG: Performed by: NURSE ANESTHETIST, CERTIFIED REGISTERED

## 2018-08-10 PROCEDURE — 86900 BLOOD TYPING SEROLOGIC ABO: CPT | Performed by: OBSTETRICS & GYNECOLOGY

## 2018-08-10 PROCEDURE — 25010000002 PROPOFOL 10 MG/ML EMULSION: Performed by: NURSE ANESTHETIST, CERTIFIED REGISTERED

## 2018-08-10 PROCEDURE — 25010000002 DEXAMETHASONE PER 1 MG: Performed by: NURSE ANESTHETIST, CERTIFIED REGISTERED

## 2018-08-10 PROCEDURE — 0UT20ZZ RESECTION OF BILATERAL OVARIES, OPEN APPROACH: ICD-10-PCS | Performed by: OBSTETRICS & GYNECOLOGY

## 2018-08-10 PROCEDURE — 25010000002 NEOSTIGMINE 4 MG/4ML SOLUTION PREFILLED SYRINGE: Performed by: NURSE ANESTHETIST, CERTIFIED REGISTERED

## 2018-08-10 PROCEDURE — 25010000002 MIDAZOLAM PER 1 MG: Performed by: NURSE ANESTHETIST, CERTIFIED REGISTERED

## 2018-08-10 PROCEDURE — 86850 RBC ANTIBODY SCREEN: CPT | Performed by: OBSTETRICS & GYNECOLOGY

## 2018-08-10 PROCEDURE — 25010000002 KETOROLAC TROMETHAMINE PER 15 MG: Performed by: OBSTETRICS & GYNECOLOGY

## 2018-08-10 PROCEDURE — 88307 TISSUE EXAM BY PATHOLOGIST: CPT | Performed by: OBSTETRICS & GYNECOLOGY

## 2018-08-10 PROCEDURE — 58150 TOTAL HYSTERECTOMY: CPT | Performed by: OBSTETRICS & GYNECOLOGY

## 2018-08-10 RX ORDER — ZOLPIDEM TARTRATE 5 MG/1
10 TABLET ORAL NIGHTLY PRN
Status: DISCONTINUED | OUTPATIENT
Start: 2018-08-10 | End: 2018-08-12 | Stop reason: HOSPADM

## 2018-08-10 RX ORDER — SODIUM CHLORIDE, SODIUM GLUCONATE, SODIUM ACETATE, POTASSIUM CHLORIDE, AND MAGNESIUM CHLORIDE 526; 502; 368; 37; 30 MG/100ML; MG/100ML; MG/100ML; MG/100ML; MG/100ML
1000 INJECTION, SOLUTION INTRAVENOUS CONTINUOUS
Status: ACTIVE | OUTPATIENT
Start: 2018-08-10 | End: 2018-08-11

## 2018-08-10 RX ORDER — BISACODYL 10 MG
10 SUPPOSITORY, RECTAL RECTAL DAILY PRN
Status: DISCONTINUED | OUTPATIENT
Start: 2018-08-10 | End: 2018-08-12 | Stop reason: HOSPADM

## 2018-08-10 RX ORDER — CLONAZEPAM 0.5 MG/1
0.5 TABLET ORAL 2 TIMES DAILY PRN
Status: DISCONTINUED | OUTPATIENT
Start: 2018-08-10 | End: 2018-08-12 | Stop reason: HOSPADM

## 2018-08-10 RX ORDER — DIPHENHYDRAMINE HYDROCHLORIDE 50 MG/ML
12.5 INJECTION INTRAMUSCULAR; INTRAVENOUS
Status: DISCONTINUED | OUTPATIENT
Start: 2018-08-10 | End: 2018-08-10 | Stop reason: HOSPADM

## 2018-08-10 RX ORDER — PANTOPRAZOLE SODIUM 40 MG/1
40 TABLET, DELAYED RELEASE ORAL EVERY MORNING
Status: DISCONTINUED | OUTPATIENT
Start: 2018-08-11 | End: 2018-08-12 | Stop reason: HOSPADM

## 2018-08-10 RX ORDER — IBUPROFEN 800 MG/1
800 TABLET ORAL EVERY 8 HOURS PRN
Qty: 60 TABLET | Refills: 12 | Status: SHIPPED | OUTPATIENT
Start: 2018-08-10

## 2018-08-10 RX ORDER — IBUPROFEN 800 MG/1
800 TABLET ORAL
Status: DISCONTINUED | OUTPATIENT
Start: 2018-08-11 | End: 2018-08-12 | Stop reason: HOSPADM

## 2018-08-10 RX ORDER — EPHEDRINE SULFATE 50 MG/ML
5 INJECTION, SOLUTION INTRAVENOUS ONCE AS NEEDED
Status: DISCONTINUED | OUTPATIENT
Start: 2018-08-10 | End: 2018-08-10 | Stop reason: HOSPADM

## 2018-08-10 RX ORDER — MIDAZOLAM HYDROCHLORIDE 1 MG/ML
INJECTION INTRAMUSCULAR; INTRAVENOUS AS NEEDED
Status: DISCONTINUED | OUTPATIENT
Start: 2018-08-10 | End: 2018-08-10 | Stop reason: SURG

## 2018-08-10 RX ORDER — NEOSTIGMINE METHYLSULFATE 4 MG/4 ML
SYRINGE (ML) INTRAVENOUS AS NEEDED
Status: DISCONTINUED | OUTPATIENT
Start: 2018-08-10 | End: 2018-08-10 | Stop reason: SURG

## 2018-08-10 RX ORDER — KETOROLAC TROMETHAMINE 30 MG/ML
60 INJECTION, SOLUTION INTRAMUSCULAR; INTRAVENOUS ONCE
Status: DISCONTINUED | OUTPATIENT
Start: 2018-08-11 | End: 2018-08-12 | Stop reason: HOSPADM

## 2018-08-10 RX ORDER — CEFAZOLIN SODIUM IN 0.9 % NACL 3 G/100 ML
3 INTRAVENOUS SOLUTION, PIGGYBACK (ML) INTRAVENOUS ONCE
Status: COMPLETED | OUTPATIENT
Start: 2018-08-10 | End: 2018-08-10

## 2018-08-10 RX ORDER — DEXAMETHASONE SODIUM PHOSPHATE 4 MG/ML
INJECTION, SOLUTION INTRA-ARTICULAR; INTRALESIONAL; INTRAMUSCULAR; INTRAVENOUS; SOFT TISSUE AS NEEDED
Status: DISCONTINUED | OUTPATIENT
Start: 2018-08-10 | End: 2018-08-10 | Stop reason: SURG

## 2018-08-10 RX ORDER — HYDROCODONE BITARTRATE AND ACETAMINOPHEN 5; 325 MG/1; MG/1
1 TABLET ORAL EVERY 4 HOURS PRN
Status: DISCONTINUED | OUTPATIENT
Start: 2018-08-10 | End: 2018-08-12 | Stop reason: HOSPADM

## 2018-08-10 RX ORDER — DEXTROSE AND SODIUM CHLORIDE 5; .45 G/100ML; G/100ML
125 INJECTION, SOLUTION INTRAVENOUS CONTINUOUS
Status: DISCONTINUED | OUTPATIENT
Start: 2018-08-10 | End: 2018-08-12

## 2018-08-10 RX ORDER — KETOROLAC TROMETHAMINE 30 MG/ML
30 INJECTION, SOLUTION INTRAMUSCULAR; INTRAVENOUS EVERY 6 HOURS
Status: DISPENSED | OUTPATIENT
Start: 2018-08-10 | End: 2018-08-11

## 2018-08-10 RX ORDER — ONDANSETRON 4 MG/1
4 TABLET, ORALLY DISINTEGRATING ORAL EVERY 6 HOURS PRN
Status: DISCONTINUED | OUTPATIENT
Start: 2018-08-10 | End: 2018-08-12 | Stop reason: HOSPADM

## 2018-08-10 RX ORDER — ONDANSETRON 2 MG/ML
INJECTION INTRAMUSCULAR; INTRAVENOUS AS NEEDED
Status: DISCONTINUED | OUTPATIENT
Start: 2018-08-10 | End: 2018-08-10 | Stop reason: SURG

## 2018-08-10 RX ORDER — DEXTROSE AND SODIUM CHLORIDE 5; .45 G/100ML; G/100ML
INJECTION, SOLUTION INTRAVENOUS
Status: COMPLETED
Start: 2018-08-10 | End: 2018-08-10

## 2018-08-10 RX ORDER — NALOXONE HCL 0.4 MG/ML
0.1 VIAL (ML) INJECTION
Status: DISCONTINUED | OUTPATIENT
Start: 2018-08-10 | End: 2018-08-12 | Stop reason: HOSPADM

## 2018-08-10 RX ORDER — GLYCOPYRROLATE 0.2 MG/ML
INJECTION INTRAMUSCULAR; INTRAVENOUS AS NEEDED
Status: DISCONTINUED | OUTPATIENT
Start: 2018-08-10 | End: 2018-08-10 | Stop reason: SURG

## 2018-08-10 RX ORDER — HYDROCODONE BITARTRATE AND ACETAMINOPHEN 10; 325 MG/1; MG/1
1 TABLET ORAL EVERY 4 HOURS PRN
Status: DISCONTINUED | OUTPATIENT
Start: 2018-08-10 | End: 2018-08-12 | Stop reason: HOSPADM

## 2018-08-10 RX ORDER — TESTOSTERONE CYPIONATE 200 MG/ML
100 INJECTION, SOLUTION INTRAMUSCULAR ONCE
Status: COMPLETED | OUTPATIENT
Start: 2018-08-10 | End: 2018-08-10

## 2018-08-10 RX ORDER — CITALOPRAM 40 MG/1
40 TABLET ORAL DAILY
Status: DISCONTINUED | OUTPATIENT
Start: 2018-08-10 | End: 2018-08-12 | Stop reason: HOSPADM

## 2018-08-10 RX ORDER — KETOROLAC TROMETHAMINE 30 MG/ML
60 INJECTION, SOLUTION INTRAMUSCULAR; INTRAVENOUS ONCE
Status: COMPLETED | OUTPATIENT
Start: 2018-08-10 | End: 2018-08-10

## 2018-08-10 RX ORDER — ROCURONIUM BROMIDE 10 MG/ML
INJECTION, SOLUTION INTRAVENOUS AS NEEDED
Status: DISCONTINUED | OUTPATIENT
Start: 2018-08-10 | End: 2018-08-10 | Stop reason: SURG

## 2018-08-10 RX ORDER — SUCCINYLCHOLINE CHLORIDE 20 MG/ML
INJECTION INTRAMUSCULAR; INTRAVENOUS AS NEEDED
Status: DISCONTINUED | OUTPATIENT
Start: 2018-08-10 | End: 2018-08-10 | Stop reason: SURG

## 2018-08-10 RX ORDER — BUPROPION HYDROCHLORIDE 150 MG/1
150 TABLET, EXTENDED RELEASE ORAL 2 TIMES DAILY
Status: DISCONTINUED | OUTPATIENT
Start: 2018-08-10 | End: 2018-08-12 | Stop reason: HOSPADM

## 2018-08-10 RX ORDER — ACETAMINOPHEN 325 MG/1
650 TABLET ORAL ONCE AS NEEDED
Status: DISCONTINUED | OUTPATIENT
Start: 2018-08-10 | End: 2018-08-10 | Stop reason: HOSPADM

## 2018-08-10 RX ORDER — ONDANSETRON 2 MG/ML
4 INJECTION INTRAMUSCULAR; INTRAVENOUS EVERY 6 HOURS PRN
Status: DISCONTINUED | OUTPATIENT
Start: 2018-08-10 | End: 2018-08-12 | Stop reason: HOSPADM

## 2018-08-10 RX ORDER — ACETAMINOPHEN 650 MG/1
650 SUPPOSITORY RECTAL ONCE AS NEEDED
Status: DISCONTINUED | OUTPATIENT
Start: 2018-08-10 | End: 2018-08-10 | Stop reason: HOSPADM

## 2018-08-10 RX ORDER — CELECOXIB 200 MG/1
400 CAPSULE ORAL ONCE
Status: COMPLETED | OUTPATIENT
Start: 2018-08-10 | End: 2018-08-10

## 2018-08-10 RX ORDER — SODIUM CHLORIDE 0.9 % (FLUSH) 0.9 %
1-10 SYRINGE (ML) INJECTION AS NEEDED
Status: DISCONTINUED | OUTPATIENT
Start: 2018-08-10 | End: 2018-08-10 | Stop reason: HOSPADM

## 2018-08-10 RX ORDER — SENNA AND DOCUSATE SODIUM 50; 8.6 MG/1; MG/1
2 TABLET, FILM COATED ORAL 2 TIMES DAILY PRN
Status: DISCONTINUED | OUTPATIENT
Start: 2018-08-10 | End: 2018-08-12 | Stop reason: HOSPADM

## 2018-08-10 RX ORDER — CEFAZOLIN SODIUM IN 0.9 % NACL 3 G/100 ML
3 INTRAVENOUS SOLUTION, PIGGYBACK (ML) INTRAVENOUS EVERY 8 HOURS
Status: COMPLETED | OUTPATIENT
Start: 2018-08-10 | End: 2018-08-11

## 2018-08-10 RX ORDER — FLUMAZENIL 0.1 MG/ML
0.2 INJECTION INTRAVENOUS AS NEEDED
Status: DISCONTINUED | OUTPATIENT
Start: 2018-08-10 | End: 2018-08-10 | Stop reason: HOSPADM

## 2018-08-10 RX ORDER — MEPERIDINE HYDROCHLORIDE 50 MG/ML
12.5 INJECTION INTRAMUSCULAR; INTRAVENOUS; SUBCUTANEOUS
Status: DISCONTINUED | OUTPATIENT
Start: 2018-08-10 | End: 2018-08-10 | Stop reason: HOSPADM

## 2018-08-10 RX ORDER — ARIPIPRAZOLE 15 MG/1
15 TABLET ORAL DAILY
Status: DISCONTINUED | OUTPATIENT
Start: 2018-08-10 | End: 2018-08-12 | Stop reason: HOSPADM

## 2018-08-10 RX ORDER — ONDANSETRON 2 MG/ML
4 INJECTION INTRAMUSCULAR; INTRAVENOUS ONCE AS NEEDED
Status: DISCONTINUED | OUTPATIENT
Start: 2018-08-10 | End: 2018-08-10 | Stop reason: HOSPADM

## 2018-08-10 RX ORDER — ONDANSETRON 4 MG/1
4 TABLET, FILM COATED ORAL EVERY 6 HOURS PRN
Status: DISCONTINUED | OUTPATIENT
Start: 2018-08-10 | End: 2018-08-12 | Stop reason: HOSPADM

## 2018-08-10 RX ORDER — LABETALOL HYDROCHLORIDE 5 MG/ML
5 INJECTION, SOLUTION INTRAVENOUS
Status: DISCONTINUED | OUTPATIENT
Start: 2018-08-10 | End: 2018-08-10 | Stop reason: HOSPADM

## 2018-08-10 RX ORDER — ESTRADIOL VALERATE 40 MG/ML
20 INJECTION INTRAMUSCULAR ONCE
Status: COMPLETED | OUTPATIENT
Start: 2018-08-10 | End: 2018-08-10

## 2018-08-10 RX ORDER — NALOXONE HCL 0.4 MG/ML
0.2 VIAL (ML) INJECTION AS NEEDED
Status: DISCONTINUED | OUTPATIENT
Start: 2018-08-10 | End: 2018-08-10 | Stop reason: HOSPADM

## 2018-08-10 RX ORDER — PROPOFOL 10 MG/ML
VIAL (ML) INTRAVENOUS AS NEEDED
Status: DISCONTINUED | OUTPATIENT
Start: 2018-08-10 | End: 2018-08-10 | Stop reason: SURG

## 2018-08-10 RX ORDER — DOCUSATE SODIUM 100 MG/1
100 CAPSULE, LIQUID FILLED ORAL 2 TIMES DAILY
Status: DISCONTINUED | OUTPATIENT
Start: 2018-08-10 | End: 2018-08-12 | Stop reason: HOSPADM

## 2018-08-10 RX ORDER — LIDOCAINE HYDROCHLORIDE 20 MG/ML
INJECTION, SOLUTION INFILTRATION; PERINEURAL AS NEEDED
Status: DISCONTINUED | OUTPATIENT
Start: 2018-08-10 | End: 2018-08-10 | Stop reason: SURG

## 2018-08-10 RX ORDER — HYDROCODONE BITARTRATE AND ACETAMINOPHEN 5; 325 MG/1; MG/1
1 TABLET ORAL EVERY 4 HOURS PRN
Qty: 30 TABLET | Refills: 0 | Status: SHIPPED | OUTPATIENT
Start: 2018-08-10 | End: 2018-08-27

## 2018-08-10 RX ORDER — SIMETHICONE 80 MG
80 TABLET,CHEWABLE ORAL 4 TIMES DAILY PRN
Status: DISCONTINUED | OUTPATIENT
Start: 2018-08-10 | End: 2018-08-12 | Stop reason: HOSPADM

## 2018-08-10 RX ORDER — FENTANYL CITRATE 50 UG/ML
INJECTION, SOLUTION INTRAMUSCULAR; INTRAVENOUS AS NEEDED
Status: DISCONTINUED | OUTPATIENT
Start: 2018-08-10 | End: 2018-08-10 | Stop reason: SURG

## 2018-08-10 RX ORDER — LOSARTAN POTASSIUM 50 MG/1
100 TABLET ORAL DAILY
Status: DISCONTINUED | OUTPATIENT
Start: 2018-08-10 | End: 2018-08-12 | Stop reason: HOSPADM

## 2018-08-10 RX ADMIN — SILVER NITRATE APPLICATORS 12 EACH: 25; 75 STICK TOPICAL at 20:31

## 2018-08-10 RX ADMIN — KETOROLAC TROMETHAMINE 30 MG: 30 INJECTION, SOLUTION INTRAMUSCULAR; INTRAVENOUS at 21:39

## 2018-08-10 RX ADMIN — SUCCINYLCHOLINE CHLORIDE 140 MG: 20 INJECTION, SOLUTION INTRAMUSCULAR; INTRAVENOUS at 08:54

## 2018-08-10 RX ADMIN — LIDOCAINE HYDROCHLORIDE 100 MG: 20 INJECTION, SOLUTION INFILTRATION; PERINEURAL at 08:54

## 2018-08-10 RX ADMIN — MIDAZOLAM 2 MG: 1 INJECTION INTRAMUSCULAR; INTRAVENOUS at 08:46

## 2018-08-10 RX ADMIN — PROPOFOL 150 MG: 10 INJECTION, EMULSION INTRAVENOUS at 08:54

## 2018-08-10 RX ADMIN — SODIUM CHLORIDE, SODIUM GLUCONATE, SODIUM ACETATE, POTASSIUM CHLORIDE, AND MAGNESIUM CHLORIDE: 526; 502; 368; 37; 30 INJECTION, SOLUTION INTRAVENOUS at 10:15

## 2018-08-10 RX ADMIN — Medication 4 MG: at 10:39

## 2018-08-10 RX ADMIN — ONDANSETRON 4 MG: 2 INJECTION INTRAMUSCULAR; INTRAVENOUS at 10:41

## 2018-08-10 RX ADMIN — ONDANSETRON 4 MG: 2 INJECTION INTRAMUSCULAR; INTRAVENOUS at 21:52

## 2018-08-10 RX ADMIN — METHOCARBAMOL 1000 MG: 100 INJECTION INTRAMUSCULAR; INTRAVENOUS at 13:44

## 2018-08-10 RX ADMIN — DEXAMETHASONE SODIUM PHOSPHATE 4 MG: 4 INJECTION, SOLUTION INTRAMUSCULAR; INTRAVENOUS at 10:41

## 2018-08-10 RX ADMIN — CELECOXIB 400 MG: 200 CAPSULE ORAL at 08:05

## 2018-08-10 RX ADMIN — DEXTROSE AND SODIUM CHLORIDE 125 ML/HR: 5; 450 INJECTION, SOLUTION INTRAVENOUS at 13:43

## 2018-08-10 RX ADMIN — DEXTROSE AND SODIUM CHLORIDE 125 ML/HR: 5; 450 INJECTION, SOLUTION INTRAVENOUS at 23:18

## 2018-08-10 RX ADMIN — TESTOSTERONE CYPIONATE 100 MG: 200 INJECTION, SOLUTION INTRAMUSCULAR at 17:14

## 2018-08-10 RX ADMIN — FENTANYL CITRATE 50 MCG: 50 INJECTION, SOLUTION INTRAMUSCULAR; INTRAVENOUS at 09:25

## 2018-08-10 RX ADMIN — CEFAZOLIN 3 G: 1 INJECTION, POWDER, FOR SOLUTION INTRAMUSCULAR; INTRAVENOUS; PARENTERAL at 08:59

## 2018-08-10 RX ADMIN — FENTANYL CITRATE 100 MCG: 50 INJECTION, SOLUTION INTRAMUSCULAR; INTRAVENOUS at 08:54

## 2018-08-10 RX ADMIN — ROCURONIUM BROMIDE 50 MG: 10 INJECTION INTRAVENOUS at 09:00

## 2018-08-10 RX ADMIN — HYDROMORPHONE HYDROCHLORIDE 0.5 MG: 1 INJECTION, SOLUTION INTRAMUSCULAR; INTRAVENOUS; SUBCUTANEOUS at 11:52

## 2018-08-10 RX ADMIN — KETOROLAC TROMETHAMINE 60 MG: 60 INJECTION, SOLUTION INTRAMUSCULAR at 11:51

## 2018-08-10 RX ADMIN — SODIUM CHLORIDE, SODIUM GLUCONATE, SODIUM ACETATE, POTASSIUM CHLORIDE, AND MAGNESIUM CHLORIDE 1000 ML: 526; 502; 368; 37; 30 INJECTION, SOLUTION INTRAVENOUS at 07:32

## 2018-08-10 RX ADMIN — ESTRADIOL VALERATE 20 MG: 40 INJECTION INTRAMUSCULAR at 17:15

## 2018-08-10 RX ADMIN — HYDROMORPHONE HYDROCHLORIDE 0.5 MG: 1 INJECTION, SOLUTION INTRAMUSCULAR; INTRAVENOUS; SUBCUTANEOUS at 12:08

## 2018-08-10 RX ADMIN — DOCUSATE SODIUM 100 MG: 100 CAPSULE, LIQUID FILLED ORAL at 21:39

## 2018-08-10 RX ADMIN — GLYCOPYRROLATE 0.8 MG: 0.2 INJECTION, SOLUTION INTRAMUSCULAR; INTRAVENOUS at 10:39

## 2018-08-10 RX ADMIN — HYDROCODONE BITARTRATE AND ACETAMINOPHEN 1 TABLET: 10; 325 TABLET ORAL at 21:38

## 2018-08-10 RX ADMIN — HYDROCODONE BITARTRATE AND ACETAMINOPHEN 1 TABLET: 10; 325 TABLET ORAL at 13:17

## 2018-08-10 RX ADMIN — FENTANYL CITRATE 50 MCG: 50 INJECTION, SOLUTION INTRAMUSCULAR; INTRAVENOUS at 10:50

## 2018-08-10 RX ADMIN — FENTANYL CITRATE 50 MCG: 50 INJECTION, SOLUTION INTRAMUSCULAR; INTRAVENOUS at 10:05

## 2018-08-10 RX ADMIN — LOSARTAN POTASSIUM 100 MG: 50 TABLET, FILM COATED ORAL at 13:49

## 2018-08-10 RX ADMIN — SODIUM CHLORIDE 3 G: 9 INJECTION, SOLUTION INTRAVENOUS at 19:37

## 2018-08-10 RX ADMIN — HYDROCODONE BITARTRATE AND ACETAMINOPHEN 1 TABLET: 10; 325 TABLET ORAL at 17:25

## 2018-08-10 NOTE — ANESTHESIA PREPROCEDURE EVALUATION
Anesthesia Evaluation     no history of anesthetic complications:  NPO Solid Status: > 8 hours  NPO Liquid Status: > 8 hours           Airway   Mallampati: III  TM distance: >3 FB  Neck ROM: full  possible difficult intubation  Dental - normal exam     Pulmonary - normal exam    breath sounds clear to auscultation  (+) a smoker Former, asthma, decreased breath sounds,   Cardiovascular - normal exam    ECG reviewed  Rhythm: regular  Rate: normal    (+) hypertension less than 2 medications,   (-) murmur    ROS comment: Normal sinus rhythm  Normal ECG  No previous ECGs available  Confirmed by GEOFF RODRIGUEZ MD ([id number]) on 8/8/2018 8:24:39 AM    Referred By:             Confirmed By:GEOFF RODRIGUEZ MD    Neuro/Psych  (+) headaches (Migraine.), psychiatric history Anxiety, Depression and Bipolar,     GI/Hepatic/Renal/Endo    (+) morbid obesity, GERD well controlled,      Musculoskeletal     Abdominal   (+) obese,    Substance History      OB/GYN          Other   (+) arthritis                     Anesthesia Plan    ASA 4     general     intravenous induction   Anesthetic plan and risks discussed with patient and spouse/significant other.

## 2018-08-10 NOTE — ANESTHESIA PROCEDURE NOTES
Airway  Urgency: elective    Airway not difficult    General Information and Staff    Patient location during procedure: OR  CRNA: CRISTAL STUBBS    Indications and Patient Condition  Indications for airway management: airway protection    Preoxygenated: yes  Mask difficulty assessment: 0 - not attempted    Final Airway Details  Final airway type: endotracheal airway      Successful airway: ETT  Cuffed: yes   Successful intubation technique: direct laryngoscopy  Facilitating devices/methods: intubating stylet and cricoid pressure  Endotracheal tube insertion site: oral  Blade: Kaia  Blade size: #4  ETT size: 7.5 mm  Cormack-Lehane Classification: grade I - full view of glottis  Placement verified by: chest auscultation and capnometry   Measured from: lips  ETT to lips (cm): 21  Number of attempts at approach: 1

## 2018-08-10 NOTE — ANESTHESIA POSTPROCEDURE EVALUATION
Patient: Rylie Palumbo    Procedure Summary     Date:  08/10/18 Room / Location:  [facility] OR   [facility] OR    Anesthesia Start:  848 Anesthesia Stop:      Procedure:  ABDOMINAL HYSTERECTOMY AND BILATERAL SALPINGO OOPHORECTOMY (Bilateral Abdomen) Diagnosis:       Endometriosis      Iron deficiency anemia due to chronic blood loss      H/O  section      Morbid obesity with BMI of 40.0-44.9, adult (CMS/HCC)      Intramural, submucous, and subserous leiomyoma of uterus      (Endometriosis [N80.9])      (Iron deficiency anemia due to chronic blood loss [D50.0])      (H/O  section [Z98.891])      (Morbid obesity with BMI of 40.0-44.9, adult (CMS/HCC) [E66.01, Z68.41])      (Intramural, submucous, and subserous leiomyoma of uterus [D25.1, D25.0, D25.2])    Surgeon:  Orlando Starr MD Provider:  Scott Camargo MD    Anesthesia Type:  general ASA Status:  4          Anesthesia Type: general  Last vitals  BP   (!) 196/92 (08/10/18 0717)   Temp   98.3 °F (36.8 °C) (08/10/18 0717)   Pulse   72 (08/10/18 0717)   Resp   18 (08/10/18 0717)     SpO2   98 % (08/10/18 0717)     Post Anesthesia Care and Evaluation    Patient location during evaluation: PACU  Patient participation: complete - patient participated  Level of consciousness: awake and alert  Pain management: adequate  Airway patency: patent  Anesthetic complications: No anesthetic complications    Cardiovascular status: acceptable  Respiratory status: acceptable  Hydration status: acceptable

## 2018-08-10 NOTE — H&P (VIEW-ONLY)
SURGICAL HISTORY AND PHYSICAL    Rylie Palumbo is a 48 y.o. y/o female     Chief Complaint: Symptomatic fibroid uterus, endometriosis, metromenorrhagia, anemia, history of , lethargy, depression, and difficulty losing weight.     HPI: 48-year-old  with one prior  delivery, she has heavy and painful periods.  She has had a gastric ulcer in the past.  She has had to have a blood transfusion for blood loss secondary to a combination of the bleeding gastric ulcer and her extremely heavy periods.     Menarche at age 8.     Past medical history includes depression, thyroid nodule, endometriosis, morbid obesity, hypertension.     Past surgical history includes  section in , sinus surgery , cholecystectomy , hernia repair , carpal tunnel surgery in , rotator cuff repair .     In  she required transfusion of 5 units of blood secondary to a combination bleeding gastric ulcer and heavy menstrual periods     She has extremely heavy periods.  She has lots of pain with her periods and has pain that radiates down her legs.  She has two older sisters who have had hysterectomies.  She strongly desires definitive therapy with hysterectomy and BSO.     She has been told she has 14 nodules on her thyroid gland.  She is scheduled for a thyroid scan on 2018.     She complains of vaginal dryness and pain during intercourse.     Current medications include Celexa, Nexium, hydrocodone, somewhat, losartan, clonazepam, diltiazem, and Abilify.     She is .     She is disabled.     She does not currently smoke or use smokeless tobacco.  She quit smoking in .     Her mother  at age 65 of hyperthermia.  Her father  at age 63 of a heart attack.  She has a 62-year-old brother who has heart problems and is diabetic.  She has a 59 sister with diabetes with diabetes.  She has a 54-year-old sister with diabetes, lupus, and factor V deficiency.  There is no family  history of breast cancer, uterine cancer, ovarian cancer, colon cancer     She complains of being tired and weak with weight gain and low energy.     We discussed adding Wellbutrin, and she wishes to try this.  We discussed potential side effects of this medication.     Pelvic ultrasound June 19, 2018 shows an enlarged fibroid uterus.  A left lateral 2.7 cm fibroid appears to be subserosal and degenerating.  Endometrial thickness 5.1 mm.  Normal-appearing ovaries bilaterally     She strongly desires to be scheduled for hysterectomy with BSO.  She wishes to be scheduled for abdominal hysterectomy and bilateral salpingo-oophorectomy on August 3, 2018.  She voices understanding that her morbid obesity, prior abdominal surgery, and multiple medical problems significantly increase the risk of surgical complications, anesthesia complications, and complications with recovery.     I spent 45 minutes in direct patient care with this patient.    Review of Systems   Constitutional: Positive for fatigue. Negative for activity change, appetite change, chills, diaphoresis, fever and unexpected weight change.   Gastrointestinal: Positive for abdominal pain and constipation. Negative for diarrhea and nausea.   Genitourinary: Positive for dyspareunia and menstrual problem. Negative for difficulty urinating, dysuria, pelvic pain, urgency, vaginal bleeding, vaginal discharge and vaginal pain.   Neurological: Positive for headaches.   Psychiatric/Behavioral: Positive for dysphoric mood and sleep disturbance. The patient is nervous/anxious.    All other systems reviewed and are negative.     Breast ROS: negative    The following portions of the patient's history were reviewed and updated as appropriate: allergies, current medications, past family history, past medical history, past social history, past surgical history and problem list.    Allergies   Allergen Reactions   • Adhesive Tape Rash   • Other Rash     Metals,Surgical Glue             Current Outpatient Prescriptions:   •  ARIPiprazole (ABILIFY) 15 MG tablet, Take 15 mg by mouth Daily., Disp: , Rfl:   •  buPROPion SR (WELLBUTRIN SR) 150 MG 12 hr tablet, Take 1 tablet by mouth 2 (Two) Times a Day., Disp: 60 tablet, Rfl: 12  •  carisoprodol (SOMA) 350 MG tablet, Take 350 mg by mouth 4 (Four) Times a Day As Needed for Muscle Spasms., Disp: , Rfl:   •  citalopram (CeleXA) 40 MG tablet, Take 40 mg by mouth Daily., Disp: , Rfl:   •  clonazePAM (KlonoPIN) 0.5 MG tablet, Take 0.5 mg by mouth 2 (Two) Times a Day As Needed for Seizures., Disp: , Rfl:   •  esomeprazole (nexIUM) 40 MG capsule, Take 40 mg by mouth 2 (Two) Times a Day., Disp: , Rfl:   •  HYDROcodone-acetaminophen (NORCO)  MG per tablet, Take 1 tablet by mouth 3 (Three) Times a Day., Disp: , Rfl:   •  loratadine (CLARITIN) 10 MG tablet, Take 10 mg by mouth Daily., Disp: , Rfl:   •  losartan (COZAAR) 50 MG tablet, Take 50 mg by mouth Daily., Disp: , Rfl:   •  vitamin D (ERGOCALCIFEROL) 91584 units capsule capsule, Take 50,000 Units by mouth 1 (One) Time Per Week., Disp: , Rfl:      The patient has a family history of   Family History   Problem Relation Age of Onset   • Hypertension Father    • Diabetes Father    • Stroke Father    • Heart attack Father    • Diabetes Mother    • Malig Hyperthermia Mother    • Hypertension Mother    • Factor V Leiden deficiency Sister    • Lupus Sister    • Diabetes Sister         Past Medical History:   Diagnosis Date   • Anemia    • Anxiety    • Asthma    • Depression    • Endometriosis    • Enlarged uterus 2018   • H/O  section 2018   • Hypertension    • Intramural, submucous, and subserous leiomyoma of uterus 2018   • Menometrorrhagia 2018   • Morbid obesity with BMI of 40.0-44.9, adult (CMS/[facility]) 2018   • Ovarian cyst         OB History      Para Term  AB Living    3 1 1   2      SAB TAB Ectopic Molar Multiple Live Births    2                  "    Social History     Social History   • Marital status:      Spouse name: N/A   • Number of children: N/A   • Years of education: N/A     Occupational History   • Not on file.     Social History Main Topics   • Smoking status: Former Smoker     Quit date: 2012   • Smokeless tobacco: Never Used   • Alcohol use No   • Drug use: No   • Sexual activity: Not on file     Other Topics Concern   • Not on file     Social History Narrative   • No narrative on file        Past Surgical History:   Procedure Laterality Date   •  SECTION     • CHOLECYSTECTOMY      2009   • CUBITAL TUNNEL RELEASE Right 2008   • ENDOSCOPIC FUNCTIONAL SINUS SURGERY (FESS)  2009   • SHOULDER ROTATOR CUFF REPAIR Right 2016   • UMBILICAL HERNIA REPAIR      Laparscopic        Patient Active Problem List   Diagnosis   • Endometriosis   • H/O  section   • Morbid obesity with BMI of 40.0-44.9, adult (CMS/[facility])   • Menometrorrhagia   • Enlarged uterus   • Intramural, submucous, and subserous leiomyoma of uterus   • Anemia        Documented Vitals    18 1441   BP: 134/90   Weight: 128 kg (282 lb)   Height: 165.1 cm (65\")       Physical Exam   Constitutional: She is oriented to person, place, and time. No distress.   Morbidly obese white female weighing 282 pounds with BMI 46.9.   HENT:   Head: Normocephalic and atraumatic.   Eyes: Pupils are equal, round, and reactive to light. Conjunctivae and EOM are normal.   Neck: Normal range of motion. Neck supple. No JVD present. No tracheal deviation present. No thyromegaly present.   Cardiovascular: Normal rate, regular rhythm, normal heart sounds and intact distal pulses.  Exam reveals no gallop and no friction rub.    No murmur heard.  Pulmonary/Chest: Effort normal and breath sounds normal. No stridor. No respiratory distress. She has no wheezes. She has no rales. She exhibits no tenderness.   Abdominal: Soft. Bowel sounds are normal. She exhibits no distension and no mass. " There is no tenderness. There is no rebound and no guarding. No hernia. Hernia confirmed negative in the right inguinal area and confirmed negative in the left inguinal area.   Genitourinary: Rectal exam shows no external hemorrhoid, no internal hemorrhoid, no fissure, no mass, no tenderness, anal tone normal and guaiac negative stool. No labial fusion. There is no rash, tenderness, lesion or injury on the right labia. There is no rash, tenderness, lesion or injury on the left labia. Uterus is enlarged and tender. Cervix exhibits no motion tenderness, no discharge and no friability. Right adnexum displays no mass, no tenderness and no fullness. Left adnexum displays no mass, no tenderness and no fullness. No erythema, tenderness or bleeding in the vagina. No foreign body in the vagina. No signs of injury around the vagina. No vaginal discharge found.   Genitourinary Comments: Enlarged, irregularly shaped, and tender uterus.  Uterosacral nodularity bilaterally.  No uterine descent.   Musculoskeletal: Normal range of motion. She exhibits no edema, tenderness or deformity.   Lymphadenopathy:     She has no cervical adenopathy.        Right: No inguinal adenopathy present.        Left: No inguinal adenopathy present.   Neurological: She is alert and oriented to person, place, and time. She has normal reflexes. She displays normal reflexes. No cranial nerve deficit. She exhibits normal muscle tone. Coordination normal.   Skin: Skin is warm and dry. No rash noted. She is not diaphoretic. No erythema. No pallor.   Psychiatric: She has a normal mood and affect. Her behavior is normal. Judgment and thought content normal.   Nursing note and vitals reviewed.    She was consented for ABDOMINAL HYSTERECTOMY WITH BILATERAL SALPINGO-OPHORECTOMY AND POSSIBLE APPENDECTOMY  We discussed the risk of no surgery to include no improvement and possible, although unlikely, undiagnosed malignancy.  The risks that were discussed included,  but were not limited to:    1. Bleeding with possible risk of blood transfusion. Blood products carry risk of HIV, infection, hepatitis, and transfusion reaction.    2. Infection requiring a antibiotic therapy.    3. Damage to internal organs such as bowel, bladder, blood vessels, or a hole(fistula) bladder and vagina or rectum and vagina requiring further surgical repair.    4. Anesthetic risk to include death.    5. Intolerance to hormone replacement therapy with risk of osteoporosis and cardiovascular disease.    6. Risk of postsurgical depression or sexual dysfunction after removal of pelvic organs.    7. Small risk for deep vein thrombosis were all explained.     8. The small risk for reoperation in the event of unanticipated bleeding or surgical injury was discussed.     9. Risk of continued pain.   10. Risk of malignancy with possible need for further surgery and/or therapy.  11. Death.   She voices understanding that her morbid obesity, prior abdominal surgery, and multiple medical problems significantly increase the risk of surgical complications, anesthesia complications, and complications with recovery.  All of her questions were answered fully. She left with a very clear understanding of the preoperative surgical indications and the nature of the surgery for which she is scheduled. She understands to be NPO after midnight.          Assessment        Diagnosis Plan   1. Intramural, submucous, and subserous leiomyoma of uterus     2. Endometriosis     3. H/O  section     4. Iron deficiency anemia due to chronic blood loss     5. Morbid obesity with BMI of 40.0-44.9, adult (CMS/HCC)     6. Enlarged uterus     7. Encounter for screening mammogram for breast cancer  Mammo Screening Digital Tomosynthesis Bilateral With CAD         Plan      1. Scheduled abdominal hysterectomy and bilateral salpingo-oophorectomy and possible appendectomy for August 3, 2018.  2. Schedule preadmission  testing.            This document has been electronically signed by Orlando Starr MD on July 24, 2018 8:06 AM

## 2018-08-11 ENCOUNTER — ANESTHESIA EVENT (OUTPATIENT)
Dept: LABOR AND DELIVERY | Facility: HOSPITAL | Age: 48
End: 2018-08-11

## 2018-08-11 ENCOUNTER — ANESTHESIA (OUTPATIENT)
Dept: LABOR AND DELIVERY | Facility: HOSPITAL | Age: 48
End: 2018-08-11

## 2018-08-11 ENCOUNTER — APPOINTMENT (OUTPATIENT)
Dept: GENERAL RADIOLOGY | Facility: HOSPITAL | Age: 48
End: 2018-08-11

## 2018-08-11 LAB
BASOPHILS # BLD AUTO: 0.02 10*3/MM3 (ref 0–0.2)
BASOPHILS NFR BLD AUTO: 0.1 % (ref 0–2)
DEPRECATED RDW RBC AUTO: 49.4 FL (ref 36.4–46.3)
DEPRECATED RDW RBC AUTO: 61.4 FL (ref 36.4–46.3)
EOSINOPHIL # BLD AUTO: 0 10*3/MM3 (ref 0–0.7)
EOSINOPHIL NFR BLD AUTO: 0 % (ref 0–7)
ERYTHROCYTE [DISTWIDTH] IN BLOOD BY AUTOMATED COUNT: 19.6 % (ref 11.5–14.5)
ERYTHROCYTE [DISTWIDTH] IN BLOOD BY AUTOMATED COUNT: 21.8 % (ref 11.5–14.5)
HCT VFR BLD AUTO: 26 % (ref 35–45)
HCT VFR BLD AUTO: 29.2 % (ref 35–45)
HCT VFR BLD AUTO: 30.8 % (ref 35–45)
HGB BLD-MCNC: 7.8 G/DL (ref 12–15.5)
HGB BLD-MCNC: 8.9 G/DL (ref 12–15.5)
HGB BLD-MCNC: 9.5 G/DL (ref 12–15.5)
IMM GRANULOCYTES # BLD: 0.07 10*3/MM3 (ref 0–0.02)
IMM GRANULOCYTES NFR BLD: 0.4 % (ref 0–0.5)
LYMPHOCYTES # BLD AUTO: 1.51 10*3/MM3 (ref 0.6–4.2)
LYMPHOCYTES NFR BLD AUTO: 9.6 % (ref 10–50)
MCH RBC QN AUTO: 20.9 PG (ref 26.5–34)
MCH RBC QN AUTO: 23.8 PG (ref 26.5–34)
MCHC RBC AUTO-ENTMCNC: 30 G/DL (ref 31.4–36)
MCHC RBC AUTO-ENTMCNC: 30.8 G/DL (ref 31.4–36)
MCV RBC AUTO: 69.7 FL (ref 80–98)
MCV RBC AUTO: 77 FL (ref 80–98)
MONOCYTES # BLD AUTO: 1.8 10*3/MM3 (ref 0–0.9)
MONOCYTES NFR BLD AUTO: 11.5 % (ref 0–12)
NEUTROPHILS # BLD AUTO: 12.31 10*3/MM3 (ref 2–8.6)
NEUTROPHILS NFR BLD AUTO: 78.4 % (ref 37–80)
NRBC BLD MANUAL-RTO: 0 /100 WBC (ref 0–0)
PLATELET # BLD AUTO: 251 10*3/MM3 (ref 150–450)
PLATELET # BLD AUTO: 320 10*3/MM3 (ref 150–450)
PMV BLD AUTO: 10.5 FL (ref 8–12)
PMV BLD AUTO: 9.8 FL (ref 8–12)
RBC # BLD AUTO: 3.73 10*6/MM3 (ref 3.77–5.16)
RBC # BLD AUTO: 4 10*6/MM3 (ref 3.77–5.16)
WBC NRBC COR # BLD: 15.71 10*3/MM3 (ref 3.2–9.8)
WBC NRBC COR # BLD: 17.57 10*3/MM3 (ref 3.2–9.8)

## 2018-08-11 PROCEDURE — 86900 BLOOD TYPING SEROLOGIC ABO: CPT

## 2018-08-11 PROCEDURE — 85025 COMPLETE CBC W/AUTO DIFF WBC: CPT | Performed by: OBSTETRICS & GYNECOLOGY

## 2018-08-11 PROCEDURE — 36430 TRANSFUSION BLD/BLD COMPNT: CPT

## 2018-08-11 PROCEDURE — 93010 ELECTROCARDIOGRAM REPORT: CPT | Performed by: INTERNAL MEDICINE

## 2018-08-11 PROCEDURE — 25010000002 CEFAZOLIN PER 500 MG: Performed by: OBSTETRICS & GYNECOLOGY

## 2018-08-11 PROCEDURE — 25010000002 MORPHINE PER 10 MG: Performed by: NURSE ANESTHETIST, CERTIFIED REGISTERED

## 2018-08-11 PROCEDURE — 25010000002 METHOCARBAMOL 1000 MG/10ML SOLUTION 10 ML VIAL: Performed by: OBSTETRICS & GYNECOLOGY

## 2018-08-11 PROCEDURE — 25010000002 FENTANYL CITRATE (PF) 100 MCG/2ML SOLUTION: Performed by: NURSE ANESTHETIST, CERTIFIED REGISTERED

## 2018-08-11 PROCEDURE — 85027 COMPLETE CBC AUTOMATED: CPT | Performed by: OBSTETRICS & GYNECOLOGY

## 2018-08-11 PROCEDURE — 25010000002 SUCCINYLCHOLINE PER 20 MG: Performed by: NURSE ANESTHETIST, CERTIFIED REGISTERED

## 2018-08-11 PROCEDURE — 25010000002 ONDANSETRON PER 1 MG: Performed by: NURSE ANESTHETIST, CERTIFIED REGISTERED

## 2018-08-11 PROCEDURE — 71045 X-RAY EXAM CHEST 1 VIEW: CPT

## 2018-08-11 PROCEDURE — 0W3N0ZZ CONTROL BLEEDING IN FEMALE PERINEUM, OPEN APPROACH: ICD-10-PCS | Performed by: OBSTETRICS & GYNECOLOGY

## 2018-08-11 PROCEDURE — 85014 HEMATOCRIT: CPT | Performed by: OBSTETRICS & GYNECOLOGY

## 2018-08-11 PROCEDURE — 25010000002 NEOSTIGMINE 4 MG/4ML SOLUTION PREFILLED SYRINGE: Performed by: NURSE ANESTHETIST, CERTIFIED REGISTERED

## 2018-08-11 PROCEDURE — 49000 EXPLORATION OF ABDOMEN: CPT | Performed by: OBSTETRICS & GYNECOLOGY

## 2018-08-11 PROCEDURE — 93005 ELECTROCARDIOGRAM TRACING: CPT | Performed by: OBSTETRICS & GYNECOLOGY

## 2018-08-11 PROCEDURE — 85018 HEMOGLOBIN: CPT | Performed by: OBSTETRICS & GYNECOLOGY

## 2018-08-11 PROCEDURE — 25010000002 KETOROLAC TROMETHAMINE PER 15 MG: Performed by: OBSTETRICS & GYNECOLOGY

## 2018-08-11 PROCEDURE — P9016 RBC LEUKOCYTES REDUCED: HCPCS

## 2018-08-11 PROCEDURE — 25010000002 PROPOFOL 10 MG/ML EMULSION: Performed by: NURSE ANESTHETIST, CERTIFIED REGISTERED

## 2018-08-11 RX ORDER — ALUMINA, MAGNESIA, AND SIMETHICONE 2400; 2400; 240 MG/30ML; MG/30ML; MG/30ML
15 SUSPENSION ORAL EVERY 6 HOURS PRN
Status: DISCONTINUED | OUTPATIENT
Start: 2018-08-11 | End: 2018-08-12 | Stop reason: HOSPADM

## 2018-08-11 RX ORDER — SODIUM CHLORIDE, SODIUM LACTATE, POTASSIUM CHLORIDE, CALCIUM CHLORIDE 600; 310; 30; 20 MG/100ML; MG/100ML; MG/100ML; MG/100ML
INJECTION, SOLUTION INTRAVENOUS CONTINUOUS PRN
Status: DISCONTINUED | OUTPATIENT
Start: 2018-08-11 | End: 2018-08-11 | Stop reason: SURG

## 2018-08-11 RX ORDER — NEOSTIGMINE METHYLSULFATE 4 MG/4 ML
SYRINGE (ML) INTRAVENOUS AS NEEDED
Status: DISCONTINUED | OUTPATIENT
Start: 2018-08-11 | End: 2018-08-11 | Stop reason: SURG

## 2018-08-11 RX ORDER — SUCCINYLCHOLINE CHLORIDE 20 MG/ML
INJECTION INTRAMUSCULAR; INTRAVENOUS AS NEEDED
Status: DISCONTINUED | OUTPATIENT
Start: 2018-08-11 | End: 2018-08-11 | Stop reason: SURG

## 2018-08-11 RX ORDER — PROPOFOL 10 MG/ML
VIAL (ML) INTRAVENOUS AS NEEDED
Status: DISCONTINUED | OUTPATIENT
Start: 2018-08-11 | End: 2018-08-11 | Stop reason: SURG

## 2018-08-11 RX ORDER — MORPHINE SULFATE 1 MG/ML
INJECTION, SOLUTION EPIDURAL; INTRATHECAL; INTRAVENOUS AS NEEDED
Status: DISCONTINUED | OUTPATIENT
Start: 2018-08-11 | End: 2018-08-11 | Stop reason: SURG

## 2018-08-11 RX ORDER — ONDANSETRON 2 MG/ML
INJECTION INTRAMUSCULAR; INTRAVENOUS AS NEEDED
Status: DISCONTINUED | OUTPATIENT
Start: 2018-08-11 | End: 2018-08-11 | Stop reason: SURG

## 2018-08-11 RX ORDER — SODIUM CHLORIDE, SODIUM LACTATE, POTASSIUM CHLORIDE, CALCIUM CHLORIDE 600; 310; 30; 20 MG/100ML; MG/100ML; MG/100ML; MG/100ML
INJECTION, SOLUTION INTRAVENOUS
Status: DISPENSED
Start: 2018-08-11 | End: 2018-08-11

## 2018-08-11 RX ORDER — FENTANYL CITRATE 50 UG/ML
INJECTION, SOLUTION INTRAMUSCULAR; INTRAVENOUS AS NEEDED
Status: DISCONTINUED | OUTPATIENT
Start: 2018-08-11 | End: 2018-08-11 | Stop reason: SURG

## 2018-08-11 RX ORDER — FAMOTIDINE 10 MG/ML
20 INJECTION, SOLUTION INTRAVENOUS DAILY
Status: DISCONTINUED | OUTPATIENT
Start: 2018-08-11 | End: 2018-08-12 | Stop reason: HOSPADM

## 2018-08-11 RX ORDER — VECURONIUM BROMIDE 20 MG/20ML
INJECTION, POWDER, LYOPHILIZED, FOR SOLUTION INTRAVENOUS AS NEEDED
Status: DISCONTINUED | OUTPATIENT
Start: 2018-08-11 | End: 2018-08-11 | Stop reason: SURG

## 2018-08-11 RX ORDER — EPHEDRINE SULFATE 50 MG/ML
INJECTION, SOLUTION INTRAVENOUS AS NEEDED
Status: DISCONTINUED | OUTPATIENT
Start: 2018-08-11 | End: 2018-08-11 | Stop reason: SURG

## 2018-08-11 RX ADMIN — ONDANSETRON 4 MG: 2 INJECTION INTRAMUSCULAR; INTRAVENOUS at 06:38

## 2018-08-11 RX ADMIN — SUCCINYLCHOLINE CHLORIDE 160 MG: 20 INJECTION, SOLUTION INTRAMUSCULAR; INTRAVENOUS at 04:37

## 2018-08-11 RX ADMIN — PROPOFOL 150 MG: 10 INJECTION, EMULSION INTRAVENOUS at 04:37

## 2018-08-11 RX ADMIN — HYDROCODONE BITARTRATE AND ACETAMINOPHEN 1 TABLET: 10; 325 TABLET ORAL at 11:00

## 2018-08-11 RX ADMIN — FENTANYL CITRATE 50 MCG: 50 INJECTION, SOLUTION INTRAMUSCULAR; INTRAVENOUS at 05:15

## 2018-08-11 RX ADMIN — HYDROCODONE BITARTRATE AND ACETAMINOPHEN 1 TABLET: 10; 325 TABLET ORAL at 17:21

## 2018-08-11 RX ADMIN — SIMETHICONE CHEW TAB 80 MG 80 MG: 80 TABLET ORAL at 21:31

## 2018-08-11 RX ADMIN — Medication 3 MG: at 06:36

## 2018-08-11 RX ADMIN — FENTANYL CITRATE 50 MCG: 50 INJECTION, SOLUTION INTRAMUSCULAR; INTRAVENOUS at 05:20

## 2018-08-11 RX ADMIN — DOCUSATE SODIUM 100 MG: 100 CAPSULE, LIQUID FILLED ORAL at 21:31

## 2018-08-11 RX ADMIN — VECURONIUM BROMIDE 2 MG: 1 INJECTION, POWDER, LYOPHILIZED, FOR SOLUTION INTRAVENOUS at 05:00

## 2018-08-11 RX ADMIN — METHOCARBAMOL 1000 MG: 100 INJECTION INTRAMUSCULAR; INTRAVENOUS at 23:33

## 2018-08-11 RX ADMIN — VECURONIUM BROMIDE 2 MG: 1 INJECTION, POWDER, LYOPHILIZED, FOR SOLUTION INTRAVENOUS at 05:10

## 2018-08-11 RX ADMIN — VECURONIUM BROMIDE 2 MG: 1 INJECTION, POWDER, LYOPHILIZED, FOR SOLUTION INTRAVENOUS at 05:47

## 2018-08-11 RX ADMIN — SODIUM CHLORIDE, POTASSIUM CHLORIDE, SODIUM LACTATE AND CALCIUM CHLORIDE: 600; 310; 30; 20 INJECTION, SOLUTION INTRAVENOUS at 06:00

## 2018-08-11 RX ADMIN — FENTANYL CITRATE 50 MCG: 50 INJECTION, SOLUTION INTRAMUSCULAR; INTRAVENOUS at 06:10

## 2018-08-11 RX ADMIN — SODIUM CHLORIDE, POTASSIUM CHLORIDE, SODIUM LACTATE AND CALCIUM CHLORIDE: 600; 310; 30; 20 INJECTION, SOLUTION INTRAVENOUS at 05:00

## 2018-08-11 RX ADMIN — KETOROLAC TROMETHAMINE 30 MG: 30 INJECTION, SOLUTION INTRAMUSCULAR; INTRAVENOUS at 03:28

## 2018-08-11 RX ADMIN — EPHEDRINE SULFATE 10 MG: 50 INJECTION INTRAVENOUS at 04:40

## 2018-08-11 RX ADMIN — MORPHINE SULFATE 5 MG: 1 INJECTION EPIDURAL; INTRATHECAL; INTRAVENOUS at 06:44

## 2018-08-11 RX ADMIN — HYDROCODONE BITARTRATE AND ACETAMINOPHEN 1 TABLET: 10; 325 TABLET ORAL at 01:13

## 2018-08-11 RX ADMIN — FENTANYL CITRATE 50 MCG: 50 INJECTION, SOLUTION INTRAMUSCULAR; INTRAVENOUS at 06:15

## 2018-08-11 RX ADMIN — EPHEDRINE SULFATE 10 MG: 50 INJECTION INTRAVENOUS at 04:45

## 2018-08-11 RX ADMIN — ALUMINUM HYDROXIDE, MAGNESIUM HYDROXIDE, AND DIMETHICONE 15 ML: 400; 400; 40 SUSPENSION ORAL at 09:47

## 2018-08-11 RX ADMIN — SODIUM CHLORIDE 3 G: 9 INJECTION, SOLUTION INTRAVENOUS at 03:29

## 2018-08-11 RX ADMIN — VECURONIUM BROMIDE 2 MG: 1 INJECTION, POWDER, LYOPHILIZED, FOR SOLUTION INTRAVENOUS at 04:51

## 2018-08-11 RX ADMIN — SODIUM CHLORIDE, POTASSIUM CHLORIDE, SODIUM LACTATE AND CALCIUM CHLORIDE: 600; 310; 30; 20 INJECTION, SOLUTION INTRAVENOUS at 04:33

## 2018-08-11 RX ADMIN — FAMOTIDINE 20 MG: 10 INJECTION INTRAVENOUS at 09:48

## 2018-08-11 RX ADMIN — KETOROLAC TROMETHAMINE 30 MG: 30 INJECTION, SOLUTION INTRAMUSCULAR; INTRAVENOUS at 17:20

## 2018-08-11 RX ADMIN — MORPHINE SULFATE 5 MG: 1 INJECTION EPIDURAL; INTRATHECAL; INTRAVENOUS at 06:38

## 2018-08-11 NOTE — PROGRESS NOTES
Called to bedside for bleeding from incision.     Upon entrance into room, patient appear pale.  Nurse holding pressure to incision.  When pressure released, again large amount of dark sanguineous fluid returned.      Discussed with patient the need to go to the OR at this point to open incision and find source of bleeding.  Discussed risk of infection, bleeding, and scarring.  Patient verbalized understanding.      Anesthesia and charge nurse notified.   Will proceed to the OR for incision repair and any additional indicated procedures.           This document has been electronically signed by Dunia Laguerre MD on August 11, 2018 4:02 AM

## 2018-08-11 NOTE — PLAN OF CARE
Problem: Patient Care Overview  Goal: Plan of Care Review  Outcome: Ongoing (interventions implemented as appropriate)   08/11/18 1310   Coping/Psychosocial   Plan of Care Reviewed With patient   Plan of Care Review   Progress improving   OTHER   Outcome Summary VSS, no bleeding at this time, dressing dry and intact, boswell dc'd and pt voiding spontaneously, pt ambulting in room and hernández, pain controlled with PO meds, SCD's on.     Goal: Individualization and Mutuality  Outcome: Ongoing (interventions implemented as appropriate)    Goal: Discharge Needs Assessment  Outcome: Ongoing (interventions implemented as appropriate)    Goal: Interprofessional Rounds/Family Conf  Outcome: Ongoing (interventions implemented as appropriate)      Problem: Hysterectomy (Adult)  Goal: Signs and Symptoms of Listed Potential Problems Will be Absent, Minimized or Managed (Hysterectomy)  Outcome: Ongoing (interventions implemented as appropriate)    Goal: Anesthesia/Sedation Recovery  Outcome: Ongoing (interventions implemented as appropriate)

## 2018-08-11 NOTE — OP NOTE
LAPAROTOMY EXPLORATORY  Progress Note    Rylie Palumbo  8/10/2018 - 2018    Pre-op Diagnosis:   Incision Repair       Post-Op Diagnosis Codes: 68343    Procedure/CPT® Codes: 73813    Procedure(s):  LAPAROTOMY EXPLORATORY    Surgeon: Dunia Laguerre MD  Assistant: Orlando Starr MD; Carol Panchal RN    Anesthesia: General    Staff:   Circulator: Digna Cade RN  Scrub Person: Cielo Calderon  L & D Nurse: Carol Panchal RN; Jeffrey Cowart RN    Estimated Blood Loss: 1500 mL    Urine Voided: 150 mL   Fluids: 2200mL + 2 units of pRBCs    Specimens:                None      Drains:   Urethral Catheter (Active)       [REMOVED] Urethral Catheter Double-lumen;Silicone 16 Fr. (Removed)   Output (mL) 100 mL 8/10/2018 12:09 PM     Indications: 49 yo  POD#1 s/p GORGE and BSO with incision bleeding.  An attempt was made to control the bleeding with pressure and silver nitrate, however, bleeding continued.  At that point the decision was made to proceed to the OR.      Findings: 1000ml of hemoperitoneum.  Active bleeding along the right pelvic side wall, hemostasis achieved with 0 vicryl.      Technique:  After assuring informed consent, the patient was taken to the operating room and general anesthesia was initiated.  The abdomen was prepped and draped in sterile fashion. Time out was performed.     The previous suture placed following her hysterectomy were removed.  The subcutaneous tissue was irrigated and small oozes were noted but nothing that would be the source of the amount of incision bleeding on exam.  Using the back of pick ups the fascia was  and a large amount of sanguineous fluid was noted, concerning for an intraabdominal bleed.  The decision was then made to open the fascia.  The fascia suture was removed.  And the intraabdominal cavity was entered.  Approximately 1000 mL of hemoperitoneum and large clots were encountered.  The abdomen was copiously irrigated and the clots were all  evacuated.  A large Alexias retractor was placed was placed and the bowel was packed with 4 moist laps.      The vaginal cuff was inspected and no active bleeding was noted.  The left pelvic side was inspected and a small ooze was noted along the peritoneum. Hemostasis was achieved with Bovie cautery.      Attention was then turned back to the right pelvic side wall and active bleeding was noted along the left pelvic side wall near the pelvic brim.  With the bowel packed out of the way, the right pelvic side wall was run with a 0 vicryl suture.  Hemostasis was achieved.  The area was watched for several minutes and hemostasis remained.  The pelvis was inspected again and no more bleeding was noted.  Hemostasis remained.  Surgicel was placed along the cuff and pelvic side walls to ensure hemostasis.      The fascia was re-approximated with 0 Vicryl in a running fasion. The subcutaneous tissue was irrigated and rendered hemostatic with Bovie cautery.  The space was closed with running sutures of 2.0 plain. The skin was closed with 3-0 Monocryl and a pressure dressing was applied.     The patient tolerated the procedure well. Needle, lap, and sponge counts were correct times two. Two grams of Kefzol were given prior to the procedure, and a sterile dressing was placed over the incision.    Complications: none  Specimens: none          This document has been electronically signed by Dunia Laguerre MD on August 11, 2018 7:02 AM

## 2018-08-11 NOTE — NURSING NOTE
Pt stated she felt light-headed and broke out in a sweat last time she ambulated to the bathroom. VS assessed in sitting and standing positions. RN noted pt became pale and diaphoretic upon sitting on the edge of chair with feet flat on the floor. Bolus of fluids started at this time. RN stayed at pt side until symptoms subsided. Assist x1 to bathroom. Pt gait steady. Pt began to become pale and diaphoretic again while sitting on toilet. RN stayed at pt side until symptoms subsided. Pt ambulated to chair x1 assist. VS reassessed. Pt educated on need to call RN next time symptoms appear or have the need to ambulate. Pt verbalized understanding. Food provided.

## 2018-08-11 NOTE — NURSING NOTE
Pt sitting in chair and when going to stand up started bleeding at incision and blood pooled in floor.  Pt put back into bed to put pressure at incision site.  Dressing saturated and removed.  Clean 4x4's and pressure applied to incision.  Pt pale and diaphoretic.

## 2018-08-11 NOTE — PROGRESS NOTES
Patient states she feels like she has some chest pain but thinks its more indigestion.  +belching.  No SOB or pain with deep breath.      Vitals:    08/11/18 0740 08/11/18 0752 08/11/18 0808 08/11/18 0823   BP:  111/56 127/59 117/56   BP Location:  Right arm Right arm Right arm   Patient Position:  Lying Lying Lying   Pulse:  96 83 99   Resp:  18 18 18   Temp:       TempSrc:       SpO2: 94% 95% 95% 95%   Weight:       Height:         Appears comfortable in bed.  AAOx3.  Not as pale as on previous exams.  Normal breathing efforts.     Low clinical suspicions for MI or PE given no tachycardia, SOB, or tachypnea on exam.  Will get EKG now.  Will repeat CBC.  Maalox and pepcid for indigestion.  O2 sat is 88% on 2L, most likely due to positioning in bed and atelectasis, however, will get CXR now.  Encouraged IS use.    Continue to monitor closely.           This document has been electronically signed by Dunia Laguerre MD on August 11, 2018 9:08 AM

## 2018-08-11 NOTE — ANESTHESIA POSTPROCEDURE EVALUATION
Patient: Rylie Palumbo    Procedure Summary     Date:  08/11/18 Room / Location:  [facility] LABOR DELIVERY 2 / [facility] LABOR DELIVERY    Anesthesia Start:  0436 Anesthesia Stop:  0659    Procedure:  LAPAROTOMY EXPLORATORY (N/A Abdomen) Diagnosis:  (Incision Repair)    Surgeon:  FridayDunia MD Provider:  Magy Hunt CRNA    Anesthesia Type:  Not recorded ASA Status:  Not recorded          Anesthesia Type: No value filed.  Last vitals  BP   106/64 (08/11/18 0123)   Temp   98.9 °F (37.2 °C) (08/11/18 0111)   Pulse   92 (08/11/18 0123)   Resp   20 (08/11/18 0123)     SpO2   96 % (08/11/18 0123)     Post Anesthesia Care and Evaluation    Patient location during evaluation: PACU  Patient participation: complete - patient participated  Level of consciousness: awake and alert  Pain score: 3  Pain management: adequate  Airway patency: patent  Anesthetic complications: No anesthetic complications    Cardiovascular status: acceptable  Respiratory status: acceptable  Hydration status: acceptable

## 2018-08-11 NOTE — PROGRESS NOTES
Called to bedside for drainage from incision.     In middle of incision, dark sanguinous fluid noted.  Large amount.  Pressure held, initially would slow down but then pick back up.  Silver nitrate used, initially unsuccessful.  Middle suture removed.  Hemostasis achieved with silver nitrate and pressure.  Incision watched for 5 minutes.  No drainage noted.  Pressure dressing applied.      Will continue to monitor closely.           This document has been electronically signed by Dunia Laguerre MD on August 10, 2018 8:41 PM

## 2018-08-11 NOTE — ANESTHESIA PROCEDURE NOTES
Airway  Urgency: emergent    Airway not difficult    General Information and Staff    Patient location during procedure: OB  CRNA: RAIN FELTON    Indications and Patient Condition  Indications for airway management: airway protection    Preoxygenated: yes  Mask difficulty assessment: 2 - vent by mask + OA or adjuvant +/- NMBA    Final Airway Details  Final airway type: endotracheal airway      Successful airway: ETT  Cuffed: yes   Successful intubation technique: direct laryngoscopy and RSI  Facilitating devices/methods: intubating stylet  Blade: Kaia  Blade size: #4  ETT size: 6.5 mm  Cormack-Lehane Classification: grade I - full view of glottis  Placement verified by: chest auscultation and capnometry   Measured from: lips  ETT to lips (cm): 20  Number of attempts at approach: 1

## 2018-08-11 NOTE — ANESTHESIA PREPROCEDURE EVALUATION
Anesthesia Evaluation         Anesthesia Plan    ASA 4 - emergent     general     intravenous induction   Anesthetic plan and risks discussed with patient.

## 2018-08-12 VITALS
HEIGHT: 65 IN | HEART RATE: 104 BPM | TEMPERATURE: 98.4 F | WEIGHT: 283.51 LBS | RESPIRATION RATE: 20 BRPM | BODY MASS INDEX: 47.24 KG/M2 | DIASTOLIC BLOOD PRESSURE: 52 MMHG | OXYGEN SATURATION: 95 % | SYSTOLIC BLOOD PRESSURE: 108 MMHG

## 2018-08-12 LAB
ABO + RH BLD: NORMAL
ABO + RH BLD: NORMAL
BH BB BLOOD EXPIRATION DATE: NORMAL
BH BB BLOOD EXPIRATION DATE: NORMAL
BH BB BLOOD TYPE BARCODE: 6200
BH BB BLOOD TYPE BARCODE: 6200
BH BB DISPENSE STATUS: NORMAL
BH BB DISPENSE STATUS: NORMAL
BH BB PRODUCT CODE: NORMAL
BH BB PRODUCT CODE: NORMAL
BH BB UNIT NUMBER: NORMAL
BH BB UNIT NUMBER: NORMAL
UNIT  ABO: NORMAL
UNIT  ABO: NORMAL
UNIT  RH: NORMAL
UNIT  RH: NORMAL

## 2018-08-12 RX ORDER — DIPHENHYDRAMINE HYDROCHLORIDE 50 MG/ML
12.5 INJECTION INTRAMUSCULAR; INTRAVENOUS
Status: DISCONTINUED | OUTPATIENT
Start: 2018-08-12 | End: 2018-08-12 | Stop reason: HOSPADM

## 2018-08-12 RX ORDER — ONDANSETRON 2 MG/ML
4 INJECTION INTRAMUSCULAR; INTRAVENOUS ONCE AS NEEDED
Status: DISCONTINUED | OUTPATIENT
Start: 2018-08-12 | End: 2018-08-12 | Stop reason: HOSPADM

## 2018-08-12 RX ORDER — MEPERIDINE HYDROCHLORIDE 50 MG/ML
12.5 INJECTION INTRAMUSCULAR; INTRAVENOUS; SUBCUTANEOUS
Status: DISCONTINUED | OUTPATIENT
Start: 2018-08-12 | End: 2018-08-12 | Stop reason: HOSPADM

## 2018-08-12 RX ORDER — ACETAMINOPHEN 650 MG/1
650 SUPPOSITORY RECTAL ONCE AS NEEDED
Status: DISCONTINUED | OUTPATIENT
Start: 2018-08-12 | End: 2018-08-12 | Stop reason: HOSPADM

## 2018-08-12 RX ORDER — FLUMAZENIL 0.1 MG/ML
0.2 INJECTION INTRAVENOUS AS NEEDED
Status: DISCONTINUED | OUTPATIENT
Start: 2018-08-12 | End: 2018-08-12 | Stop reason: HOSPADM

## 2018-08-12 RX ORDER — SODIUM CHLORIDE, SODIUM LACTATE, POTASSIUM CHLORIDE, CALCIUM CHLORIDE 600; 310; 30; 20 MG/100ML; MG/100ML; MG/100ML; MG/100ML
125 INJECTION, SOLUTION INTRAVENOUS CONTINUOUS
Status: DISCONTINUED | OUTPATIENT
Start: 2018-08-11 | End: 2018-08-12 | Stop reason: HOSPADM

## 2018-08-12 RX ORDER — EPHEDRINE SULFATE 50 MG/ML
5 INJECTION, SOLUTION INTRAVENOUS ONCE AS NEEDED
Status: DISCONTINUED | OUTPATIENT
Start: 2018-08-12 | End: 2018-08-12 | Stop reason: HOSPADM

## 2018-08-12 RX ORDER — NALOXONE HCL 0.4 MG/ML
0.2 VIAL (ML) INJECTION AS NEEDED
Status: DISCONTINUED | OUTPATIENT
Start: 2018-08-12 | End: 2018-08-12 | Stop reason: HOSPADM

## 2018-08-12 RX ORDER — ACETAMINOPHEN 325 MG/1
650 TABLET ORAL ONCE AS NEEDED
Status: DISCONTINUED | OUTPATIENT
Start: 2018-08-12 | End: 2018-08-12 | Stop reason: HOSPADM

## 2018-08-12 RX ORDER — LABETALOL HYDROCHLORIDE 5 MG/ML
5 INJECTION, SOLUTION INTRAVENOUS
Status: DISCONTINUED | OUTPATIENT
Start: 2018-08-12 | End: 2018-08-12 | Stop reason: HOSPADM

## 2018-08-12 RX ADMIN — PANTOPRAZOLE SODIUM 40 MG: 40 TABLET, DELAYED RELEASE ORAL at 09:38

## 2018-08-12 RX ADMIN — IBUPROFEN 800 MG: 800 TABLET ORAL at 09:38

## 2018-08-12 RX ADMIN — HYDROCODONE BITARTRATE AND ACETAMINOPHEN 1 TABLET: 5; 325 TABLET ORAL at 06:21

## 2018-08-12 RX ADMIN — DOCUSATE SODIUM 100 MG: 100 CAPSULE, LIQUID FILLED ORAL at 09:38

## 2018-08-12 NOTE — PLAN OF CARE
Problem: Patient Care Overview  Goal: Plan of Care Review  Outcome: Ongoing (interventions implemented as appropriate)   08/12/18 0551   Coping/Psychosocial   Plan of Care Reviewed With patient   Plan of Care Review   Progress improving   OTHER   Outcome Summary vss, no bleeding, dressing dry and intact. voiding, not passing gas, ambulating hallway     Goal: Individualization and Mutuality  Outcome: Ongoing (interventions implemented as appropriate)    Goal: Discharge Needs Assessment  Outcome: Ongoing (interventions implemented as appropriate)    Goal: Interprofessional Rounds/Family Conf  Outcome: Ongoing (interventions implemented as appropriate)      Problem: Hysterectomy (Adult)  Goal: Signs and Symptoms of Listed Potential Problems Will be Absent, Minimized or Managed (Hysterectomy)  Outcome: Ongoing (interventions implemented as appropriate)    Goal: Anesthesia/Sedation Recovery  Outcome: Outcome(s) achieved Date Met: 08/12/18

## 2018-08-12 NOTE — DISCHARGE INSTR - APPOINTMENTS
Please call office on Monday morning for appointment date/time per Dr. Starr's instruction [phone]

## 2018-08-12 NOTE — PROGRESS NOTES
GYN progress note    Patient states she feels much better today.  +amb, +voiding.  No vaginal bleeding.  Tolerated regular diet but no flatus.  Would like to go possibly go home today.     Vitals:    18 1728 18 2025 18 0140 18 0500   BP: 121/66 115/60 125/58 111/53   BP Location: Right arm Right arm Right arm Right arm   Patient Position: Sitting Sitting Sitting Sitting   Pulse: 94 93 103 105   Resp: 20 20 20 20   Temp: 98.3 °F (36.8 °C) 97.8 °F (36.6 °C) 98.6 °F (37 °C) 98.3 °F (36.8 °C)   TempSrc: Oral Oral Oral Oral   SpO2: 94% 94% 94% 95%   Weight:       Height:         General - sitting in chair, AAOx3  Abd - soft, nttp, incision c/d/i with bandage  Ext - no CT bilaterally     A/P: 47 yo  POD#2 s/p GORGE, BSO and POD#1 s/p exlap.   1. Continue routine post op care - encourage ambulation, regular diet, PO pain medications.  Discussed with patient I would like to see her pass flatus prior to discharge, discussed possibility of using suppository if needed.      Will reevaluate in afternoon for possible discharge home today.           This document has been electronically signed by Dunia Laguerre MD on 2018 7:59 AM

## 2018-08-12 NOTE — DISCHARGE SUMMARY
Discharge Summary:    Admission date: 08/10/2018  Discharge date: 2018    Admitting Diagnosis:  1. Endometriosis  2. Hypertension  3. Depression   4. Thyroid nodule  5. Morbid obesity   6. Iron deficiency anemia     Discharge Diagnosis:  1. Same, s/p GORGE, BSO  2. S/p Exploratory laparotomy   3. Acute blood loss anemia on chronic anemia s/p transfusion    History and Hospital Course:  Patient is a  with a long standing history of endometriosis and menometrorrhagia.  She desired definitive surgical treatment.  She was admitted and underwent a total abdominal hysterectomy and bilateral salpingo-oophorectomy.      Immediately post operatively, patient reported bleeding from her incision.   Her dressing was changed and she was monitored.  After several hours, the dressing was saturated.  Sangious fluid was noted to be coming from her incision.  Intiailly there was concern that the bleeding was coming from the subcutaneous tissues and an attempt was made to achieve hemostasis with silver nitrate and pressure.  However, after several hours, the bleeding returned and given the large amount the decision was made to return to the OR.     The patient was take back for an exploratory laparotomy for which an active bleeder was noted along the right pelvic side wall.  Once hemostasis was achieved, the remainder of the pelvis was surveyed and no additional bleeding was noted.  Intraoperatively, there was noted to be fall in H&H from 12.4/40.3 to 7.8/26.0.  She was transfused 2 units of pRBCs in the OR.      In the recovery room, following surgery, she reported chest pain.  An EKG, CXR, and labs were obtained.  Her H&H georges to 9.5/30.8 and all other work up was negative.  Her chest pain resolved with antiacids.      Her post operative course following the exploratory laparotomy has been unremarkable.  She had no signs or symptoms of acute blood loss anemia.  She was ambulating well, voiding without difficulty and  passing flatus.  She was stable for discharge on post operative day #2.       Precautions and instructions were discussed with her including but not limited to maintaining a regular diet at home, practicing local hygiene, pelvic rest, and signs and symptoms to report including heavy bleeding, frequent passage of clots, fainting or dizziness, foul odor of lochia, increasing pain, fever, or any other concerns.  She was asked to follow up in the office in 2 weeks.      Discharge diet: regular  Discharge condition: stable  Discharge to: Home  Follow up in 1 week       Discharge Medications      New Medications      Instructions Start Date   estradiol 0.1 MG/24HR patch  Commonly known as:  CLIMARA   1 patch, Transdermal, Weekly      ibuprofen 800 MG tablet  Commonly known as:  ADVIL,MOTRIN   800 mg, Oral, Every 8 Hours PRN         Changes to Medications      Instructions Start Date   HYDROcodone-acetaminophen  MG per tablet  Commonly known as:  ROLFCO  What changed:  Another medication with the same name was added. Make sure you understand how and when to take each.   1 tablet, Oral, 3 Times Daily PRN      HYDROcodone-acetaminophen 5-325 MG per tablet  Commonly known as:  CAROLINA  What changed:  You were already taking a medication with the same name, and this prescription was added. Make sure you understand how and when to take each.   1 tablet, Oral, Every 4 Hours PRN         Continue These Medications      Instructions Start Date   ARIPiprazole 15 MG tablet  Commonly known as:  ABILIFY   15 mg, Oral, Daily      buPROPion  MG 12 hr tablet  Commonly known as:  WELLBUTRIN SR   150 mg, Oral, 2 Times Daily      carisoprodol 350 MG tablet  Commonly known as:  SOMA   350 mg, Oral, 3 Times Daily PRN      citalopram 40 MG tablet  Commonly known as:  CeleXA   40 mg, Oral, Daily      clonazePAM 0.5 MG tablet  Commonly known as:  KlonoPIN   0.5 mg, Oral, 2 Times Daily PRN      esomeprazole 40 MG capsule  Commonly known  as:  nexIUM   40 mg, Oral, 2 Times Daily      ferrous sulfate 324 (65 Fe) MG tablet delayed-release EC tablet   324 mg, Oral, Daily With Breakfast      losartan 50 MG tablet  Commonly known as:  COZAAR   100 mg, Oral, Daily      vitamin D 12417 units capsule capsule  Commonly known as:  ERGOCALCIFEROL   50,000 Units, Oral, Every 30 Days                 This document has been electronically signed by Dunia Laguerre MD on August 12, 2018 11:10 AM

## 2018-08-13 NOTE — PAYOR COMM NOTE
"Rylie Palumbo (48 y.o. Female)     Date of Birth Social Security Number Address Home Phone MRN    1970  [id number] [facility] [address] [phone] [id number]    Latter-day Marital Status          Hoahaoism        Admission Date Admission Type Admitting Provider Attending Provider Department, Room/Bed    8/10/18 Elective Orlando Starr MD  [facility], M763/1    Discharge Date Discharge Disposition Discharge Destination        2018 Home or Self Care              Attending Provider:  (none)   Allergies:  Adhesive Tape, Other    Isolation:  None   Infection:  None   Code Status:  Not on file    Ht:  165.1 cm (65\")   Wt:  129 kg (283 lb 8.2 oz)    Admission Cmt:  None   Principal Problem:  None                Active Insurance as of 8/10/2018     Primary Coverage     Payor Plan Insurance Group Employer/Plan Group    HUMANA MEDICARE REPLACEMENT HUMANA MEDICARE REPL [id number]     Payor Plan Address Payor Plan Phone Number Effective From Effective To    [address] [address] [phone] 2018     [facility] [address]       Subscriber Name Subscriber Birth Date Member ID       RYLIE PALUMBO 1970 [id number]                 Emergency Contacts      (Rel.) Home Phone Work Phone Mobile Phone    Miko Palumbo (Spouse) [phone] -- [phone]               Discharge Summary      Friday, Dunia AMOR MD at 2018 11:00 AM          Discharge Summary:    Admission date: 08/10/2018  Discharge date: 2018    Admitting Diagnosis:  1. Endometriosis  2. Hypertension  3. Depression   4. Thyroid nodule  5. Morbid obesity   6. Iron deficiency anemia     Discharge Diagnosis:  1. Same, s/p GORGE, BSO  2. S/p Exploratory laparotomy   3. Acute blood loss anemia on chronic anemia s/p transfusion    History and Hospital Course:  Patient is a  with a long standing history of endometriosis and menometrorrhagia.  She desired definitive surgical " treatment.  She was admitted and underwent a total abdominal hysterectomy and bilateral salpingo-oophorectomy.      Immediately post operatively, patient reported bleeding from her incision.   Her dressing was changed and she was monitored.  After several hours, the dressing was saturated.  Sangious fluid was noted to be coming from her incision.  Intiailly there was concern that the bleeding was coming from the subcutaneous tissues and an attempt was made to achieve hemostasis with silver nitrate and pressure.  However, after several hours, the bleeding returned and given the large amount the decision was made to return to the OR.     The patient was take back for an exploratory laparotomy for which an active bleeder was noted along the right pelvic side wall.  Once hemostasis was achieved, the remainder of the pelvis was surveyed and no additional bleeding was noted.  Intraoperatively, there was noted to be fall in H&H from 12.4/40.3 to 7.8/26.0.  She was transfused 2 units of pRBCs in the OR.      In the recovery room, following surgery, she reported chest pain.  An EKG, CXR, and labs were obtained.  Her H&H georges to 9.5/30.8 and all other work up was negative.  Her chest pain resolved with antiacids.      Her post operative course following the exploratory laparotomy has been unremarkable.  She had no signs or symptoms of acute blood loss anemia.  She was ambulating well, voiding without difficulty and passing flatus.  She was stable for discharge on post operative day #2.       Precautions and instructions were discussed with her including but not limited to maintaining a regular diet at home, practicing local hygiene, pelvic rest, and signs and symptoms to report including heavy bleeding, frequent passage of clots, fainting or dizziness, foul odor of lochia, increasing pain, fever, or any other concerns.  She was asked to follow up in the office in 2 weeks.      Discharge diet: regular  Discharge condition:  stable  Discharge to: Home  Follow up in 1 week       Discharge Medications      New Medications      Instructions Start Date   estradiol 0.1 MG/24HR patch  Commonly known as:  CLIMARA   1 patch, Transdermal, Weekly      ibuprofen 800 MG tablet  Commonly known as:  ADVIL,MOTRIN   800 mg, Oral, Every 8 Hours PRN         Changes to Medications      Instructions Start Date   HYDROcodone-acetaminophen  MG per tablet  Commonly known as:  NORCO  What changed:  Another medication with the same name was added. Make sure you understand how and when to take each.   1 tablet, Oral, 3 Times Daily PRN      HYDROcodone-acetaminophen 5-325 MG per tablet  Commonly known as:  NORCO  What changed:  You were already taking a medication with the same name, and this prescription was added. Make sure you understand how and when to take each.   1 tablet, Oral, Every 4 Hours PRN         Continue These Medications      Instructions Start Date   ARIPiprazole 15 MG tablet  Commonly known as:  ABILIFY   15 mg, Oral, Daily      buPROPion  MG 12 hr tablet  Commonly known as:  WELLBUTRIN SR   150 mg, Oral, 2 Times Daily      carisoprodol 350 MG tablet  Commonly known as:  SOMA   350 mg, Oral, 3 Times Daily PRN      citalopram 40 MG tablet  Commonly known as:  CeleXA   40 mg, Oral, Daily      clonazePAM 0.5 MG tablet  Commonly known as:  KlonoPIN   0.5 mg, Oral, 2 Times Daily PRN      esomeprazole 40 MG capsule  Commonly known as:  nexIUM   40 mg, Oral, 2 Times Daily      ferrous sulfate 324 (65 Fe) MG tablet delayed-release EC tablet   324 mg, Oral, Daily With Breakfast      losartan 50 MG tablet  Commonly known as:  COZAAR   100 mg, Oral, Daily      vitamin D 63251 units capsule capsule  Commonly known as:  ERGOCALCIFEROL   50,000 Units, Oral, Every 30 Days                 This document has been electronically signed by Dunia Laguerre MD on August 12, 2018 11:10 AM          Electronically signed by Dunia Laguerre MD at 8/12/2018  11:10 AM

## 2018-08-17 LAB
LAB AP CASE REPORT: NORMAL
PATH REPORT.FINAL DX SPEC: NORMAL
PATH REPORT.GROSS SPEC: NORMAL

## 2018-08-20 DIAGNOSIS — D50.0 IRON DEFICIENCY ANEMIA DUE TO CHRONIC BLOOD LOSS: Primary | ICD-10-CM

## 2018-08-22 RX ORDER — FLUCONAZOLE 150 MG/1
150 TABLET ORAL DAILY
Qty: 2 TABLET | Refills: 12 | Status: SHIPPED | OUTPATIENT
Start: 2018-08-22 | End: 2018-08-29

## 2018-08-22 RX ORDER — CEPHALEXIN 500 MG/1
500 CAPSULE ORAL 4 TIMES DAILY
Qty: 40 CAPSULE | Refills: 0 | Status: SHIPPED | OUTPATIENT
Start: 2018-08-22 | End: 2018-09-01

## 2018-08-27 ENCOUNTER — OFFICE VISIT (OUTPATIENT)
Dept: OBSTETRICS AND GYNECOLOGY | Facility: CLINIC | Age: 48
End: 2018-08-27

## 2018-08-27 VITALS
HEIGHT: 65 IN | WEIGHT: 277 LBS | BODY MASS INDEX: 46.15 KG/M2 | SYSTOLIC BLOOD PRESSURE: 160 MMHG | DIASTOLIC BLOOD PRESSURE: 96 MMHG

## 2018-08-27 DIAGNOSIS — Z48.89 POSTOPERATIVE VISIT: Primary | ICD-10-CM

## 2018-08-27 PROCEDURE — 99024 POSTOP FOLLOW-UP VISIT: CPT | Performed by: OBSTETRICS & GYNECOLOGY

## 2018-08-29 ENCOUNTER — OFFICE VISIT (OUTPATIENT)
Dept: OBSTETRICS AND GYNECOLOGY | Facility: CLINIC | Age: 48
End: 2018-08-29

## 2018-08-29 VITALS
BODY MASS INDEX: 46.05 KG/M2 | WEIGHT: 276.4 LBS | HEIGHT: 65 IN | DIASTOLIC BLOOD PRESSURE: 100 MMHG | SYSTOLIC BLOOD PRESSURE: 180 MMHG

## 2018-08-29 DIAGNOSIS — Z48.89 POSTOPERATIVE VISIT: Primary | ICD-10-CM

## 2018-08-29 PROCEDURE — 99024 POSTOP FOLLOW-UP VISIT: CPT | Performed by: OBSTETRICS & GYNECOLOGY

## 2018-10-21 NOTE — PROGRESS NOTES
Rylie Palumbo is a 48 y.o. y/o female     Chief Complaint: Postop     HPI: 48-year-old  with one prior  delivery, she has heavy and painful periods.  She has had a gastric ulcer in the past.  She has had to have a blood transfusion for blood loss secondary to a combination of the bleeding gastric ulcer and her extremely heavy periods.    She underwent total abdominal hysterectomy and bilateral salpingo-oophorectomy on August 10, 2018.  She had to be taken back to the operating room the next day for postoperative bleeding.    Pathology:    UTERUS:  PROLIFERATIVE ENDOMETRIUM.   ADENOMYOSIS.   LEIOMYOMAS, INTRAMURAL (3).      CERVIX:  CHRONIC CERVICITIS.      OVARIES (2) AND FALLOPIAN TUBES (2):   NO SIGNIFICANT PATHOLOGIC DIAGNOSIS.      Menarche at age 8.     Past medical history includes depression, thyroid nodule, endometriosis, morbid obesity, hypertension.     Past surgical history includes  section in , sinus surgery , cholecystectomy , hernia repair , carpal tunnel surgery in , rotator cuff repair .     In  she required transfusion of 5 units of blood secondary to a combination bleeding gastric ulcer and heavy menstrual periods     She has extremely heavy periods.  She has lots of pain with her periods and has pain that radiates down her legs.  She has two older sisters who have had hysterectomies.  She strongly desires definitive therapy with hysterectomy and BSO.     She has been told she has 14 nodules on her thyroid gland.  She is scheduled for a thyroid scan on 2018.     She complains of vaginal dryness and pain during intercourse.     Current medications include Celexa, Nexium, hydrocodone, somewhat, losartan, clonazepam, diltiazem, and Abilify.     She is .     She is disabled.     She does not currently smoke or use smokeless tobacco.  She quit smoking in .     Her mother  at age 65 of hyperthermia.  Her father  at age  63 of a heart attack.  She has a 62-year-old brother who has heart problems and is diabetic.  She has a 59 sister with diabetes with diabetes.  She has a 54-year-old sister with diabetes, lupus, and factor V deficiency.  There is no family history of breast cancer, uterine cancer, ovarian cancer, colon cancer     She complains of being tired and weak with weight gain and low energy.     We discussed adding Wellbutrin, and she wishes to try this.  We discussed potential side effects of this medication.     Pelvic ultrasound June 19, 2018 shows an enlarged fibroid uterus.  A left lateral 2.7 cm fibroid appears to be subserosal and degenerating.  Endometrial thickness 5.1 mm.  Normal-appearing ovaries bilaterally     August 27, 2018, she was having some drainage from her incision.  There was no evidence of infection.  I will see her back in a couple of days.    Review of Systems   Constitutional: Positive for fatigue. Negative for activity change, appetite change, chills, diaphoresis, fever and unexpected weight change.   Gastrointestinal: Positive for abdominal pain and constipation. Negative for diarrhea and nausea.   Genitourinary: Positive for dyspareunia and menstrual problem. Negative for difficulty urinating, dysuria, pelvic pain, urgency, vaginal bleeding, vaginal discharge and vaginal pain.   Neurological: Positive for headaches.   Psychiatric/Behavioral: Positive for dysphoric mood and sleep disturbance. The patient is nervous/anxious.    All other systems reviewed and are negative.     Breast ROS: negative    The following portions of the patient's history were reviewed and updated as appropriate: allergies, current medications, past family history, past medical history, past social history, past surgical history and problem list.    Allergies   Allergen Reactions   • Adhesive Tape Rash   • Other Rash     Metals,Surgical Glue, swelled from [facility]well cath placement approx 9 years ago          Current Outpatient  Prescriptions:   •  ARIPiprazole (ABILIFY) 15 MG tablet, Take 15 mg by mouth Daily., Disp: , Rfl:   •  buPROPion SR (WELLBUTRIN SR) 150 MG 12 hr tablet, Take 1 tablet by mouth 2 (Two) Times a Day., Disp: 60 tablet, Rfl: 12  •  carisoprodol (SOMA) 350 MG tablet, Take 350 mg by mouth 3 (Three) Times a Day As Needed for Muscle Spasms., Disp: , Rfl:   •  citalopram (CeleXA) 40 MG tablet, Take 40 mg by mouth Daily., Disp: , Rfl:   •  clonazePAM (KlonoPIN) 0.5 MG tablet, Take 0.5 mg by mouth 2 (Two) Times a Day As Needed for Anxiety., Disp: , Rfl:   •  esomeprazole (nexIUM) 40 MG capsule, Take 40 mg by mouth 2 (Two) Times a Day., Disp: , Rfl:   •  estradiol (CLIMARA) 0.1 MG/24HR patch, Place 1 patch on the skin as directed by provider 1 (One) Time Per Week., Disp: 4 patch, Rfl: 12  •  ferrous sulfate 324 (65 Fe) MG tablet delayed-release EC tablet, Take 324 mg by mouth Daily With Breakfast., Disp: , Rfl:   •  HYDROcodone-acetaminophen (NORCO)  MG per tablet, Take 1 tablet by mouth 3 (Three) Times a Day As Needed., Disp: , Rfl:   •  ibuprofen (ADVIL,MOTRIN) 800 MG tablet, Take 1 tablet by mouth Every 8 (Eight) Hours As Needed for Mild Pain ., Disp: 60 tablet, Rfl: 12  •  losartan (COZAAR) 50 MG tablet, Take 100 mg by mouth Daily., Disp: , Rfl:   •  vitamin D (ERGOCALCIFEROL) 93276 units capsule capsule, Take 50,000 Units by mouth Every 30 (Thirty) Days., Disp: , Rfl:      The patient has a family history of   Family History   Problem Relation Age of Onset   • Hypertension Father    • Diabetes Father    • Stroke Father    • Heart attack Father    • Diabetes Mother    • Malig Hyperthermia Mother    • Hypertension Mother    • Factor V Leiden deficiency Sister    • Lupus Sister    • Diabetes Sister         Past Medical History:   Diagnosis Date   • Abdominal pain     periods irregular   • Anemia    • Anxiety    • Arthritis    • Asthma    • Bipolar 1 disorder (CMS/HCC)    • Depression    • Endometriosis    • Enlarged  uterus 2018   • GERD (gastroesophageal reflux disease)    • H/O  section 2018   • Hypertension    • IBS (irritable bowel syndrome)    • Intramural, submucous, and subserous leiomyoma of uterus 2018   • Menometrorrhagia 2018   • Migraines    • Morbid obesity with BMI of 40.0-44.9, adult (CMS/[facility]) 2018   • Ovarian cyst    • Rash     pt relates that she breaks out on arms and lower back sometimes face   • Ulcer (CMS/[facility])         OB History      Para Term  AB Living    3 1 1   2      SAB TAB Ectopic Molar Multiple Live Births    2                     Social History     Social History   • Marital status:      Spouse name: N/A   • Number of children: N/A   • Years of education: N/A     Occupational History   • Not on file.     Social History Main Topics   • Smoking status: Former Smoker     Quit date: 2012   • Smokeless tobacco: Never Used   • Alcohol use No   • Drug use: No   • Sexual activity: Not on file     Other Topics Concern   • Not on file     Social History Narrative   • No narrative on file        Past Surgical History:   Procedure Laterality Date   • CARPAL TUNNEL RELEASE     •  SECTION     • CHOLECYSTECTOMY         • CUBITAL TUNNEL RELEASE Right    • ENDOSCOPIC FUNCTIONAL SINUS SURGERY (FESS)     • EXPLORATORY LAPAROTOMY N/A 2018    Procedure: LAPAROTOMY EXPLORATORY;  Surgeon: FridayDunia MD;  Location: [facility] LABOR DELIVERY;  Service: Obstetrics/Gynecology   • SHOULDER ROTATOR CUFF REPAIR Right    • TOTAL ABDOMINAL HYSTERECTOMY WITH SALPINGO OOPHORECTOMY Bilateral 8/10/2018    Procedure: ABDOMINAL HYSTERECTOMY AND BILATERAL SALPINGO OOPHORECTOMY;  Surgeon: Orlando Starr MD;  Location: [facility] OR;  Service: Obstetrics/Gynecology   • UMBILICAL HERNIA REPAIR      Laparscopic        Patient Active Problem List   Diagnosis   • Endometriosis   • H/O  section   • Morbid obesity with BMI of 40.0-44.9, adult  "(CMS/HCC)   • Menometrorrhagia   • Enlarged uterus   • Intramural, submucous, and subserous leiomyoma of uterus   • Anemia   • Iron deficiency anemia due to chronic blood loss        Documented Vitals    08/27/18 1223   BP: 160/96   Weight: 126 kg (277 lb)   Height: 165.1 cm (65\")       Physical Exam   Constitutional: She is oriented to person, place, and time. No distress.   Obese white female weighing 277 pounds with BMI 46.1.     HENT:   Head: Normocephalic and atraumatic.   Eyes: Pupils are equal, round, and reactive to light. Conjunctivae and EOM are normal.   Neck: Normal range of motion. Neck supple. No JVD present. No tracheal deviation present. No thyromegaly present.   Cardiovascular: Normal rate, regular rhythm, normal heart sounds and intact distal pulses.  Exam reveals no gallop and no friction rub.    No murmur heard.  Pulmonary/Chest: Effort normal and breath sounds normal. No stridor. No respiratory distress. She has no wheezes. She has no rales. She exhibits no tenderness.   Abdominal: Soft. Bowel sounds are normal. She exhibits no distension and no mass. There is no tenderness. There is no rebound and no guarding. No hernia.   Skin with good closure.   Musculoskeletal: Normal range of motion. She exhibits no edema, tenderness or deformity.   Lymphadenopathy:     She has no cervical adenopathy.   Neurological: She is alert and oriented to person, place, and time. She has normal reflexes. She displays normal reflexes. No cranial nerve deficit. She exhibits normal muscle tone. Coordination normal.   Skin: Skin is warm and dry. No rash noted. She is not diaphoretic. No erythema. No pallor.   Psychiatric: She has a normal mood and affect. Her behavior is normal. Judgment and thought content normal.   Nursing note and vitals reviewed.    Assessment        Diagnosis Plan   1. Postoperative visit           Plan      Follow-up in 2 days.  Follow-up sooner as needed.          This document has been " electronically signed by Orlando Starr MD on October 20, 2018 7:20 PM

## 2018-10-21 NOTE — PROGRESS NOTES
Rylie Palumbo is a 48 y.o. y/o female     Chief Complaint: Postop     HPI: 48-year-old  with one prior  delivery, she has heavy and painful periods.  She has had a gastric ulcer in the past.  She has had to have a blood transfusion for blood loss secondary to a combination of the bleeding gastric ulcer and her extremely heavy periods.    She underwent total abdominal hysterectomy and bilateral salpingo-oophorectomy on August 10, 2018.  She had to be taken back to the operating room the next day for postoperative bleeding.    Pathology:    UTERUS:  PROLIFERATIVE ENDOMETRIUM.   ADENOMYOSIS.   LEIOMYOMAS, INTRAMURAL (3).      CERVIX:  CHRONIC CERVICITIS.      OVARIES (2) AND FALLOPIAN TUBES (2):   NO SIGNIFICANT PATHOLOGIC DIAGNOSIS.      Menarche at age 8.     Past medical history includes depression, thyroid nodule, endometriosis, morbid obesity, hypertension.     Past surgical history includes  section in , sinus surgery , cholecystectomy , hernia repair , carpal tunnel surgery in , rotator cuff repair .     In  she required transfusion of 5 units of blood secondary to a combination bleeding gastric ulcer and heavy menstrual periods     She has extremely heavy periods.  She has lots of pain with her periods and has pain that radiates down her legs.  She has two older sisters who have had hysterectomies.  She strongly desires definitive therapy with hysterectomy and BSO.     She has been told she has 14 nodules on her thyroid gland.  She is scheduled for a thyroid scan on 2018.     She complains of vaginal dryness and pain during intercourse.     Current medications include Celexa, Nexium, hydrocodone, somewhat, losartan, clonazepam, diltiazem, and Abilify.     She is .     She is disabled.     She does not currently smoke or use smokeless tobacco.  She quit smoking in .     Her mother  at age 65 of hyperthermia.  Her father  at age  63 of a heart attack.  She has a 62-year-old brother who has heart problems and is diabetic.  She has a 59 sister with diabetes with diabetes.  She has a 54-year-old sister with diabetes, lupus, and factor V deficiency.  There is no family history of breast cancer, uterine cancer, ovarian cancer, colon cancer     She complains of being tired and weak with weight gain and low energy.     We discussed adding Wellbutrin, and she wishes to try this.  We discussed potential side effects of this medication.     Pelvic ultrasound June 19, 2018 shows an enlarged fibroid uterus.  A left lateral 2.7 cm fibroid appears to be subserosal and degenerating.  Endometrial thickness 5.1 mm.  Normal-appearing ovaries bilaterally     August 27, 2018, she was having some drainage from her incision.  There was no evidence of infection.  I will see her back in a couple of days.    August 29, 2018, no longer having drainage from incision.  I will see her back in 3 weeks.    Review of Systems   Constitutional: Positive for fatigue. Negative for activity change, appetite change, chills, diaphoresis, fever and unexpected weight change.   Gastrointestinal: Positive for abdominal pain and constipation. Negative for diarrhea and nausea.   Genitourinary: Positive for dyspareunia and menstrual problem. Negative for difficulty urinating, dysuria, pelvic pain, urgency, vaginal bleeding, vaginal discharge and vaginal pain.   Neurological: Positive for headaches.   Psychiatric/Behavioral: Positive for dysphoric mood and sleep disturbance. The patient is nervous/anxious.    All other systems reviewed and are negative.     Breast ROS: negative    The following portions of the patient's history were reviewed and updated as appropriate: allergies, current medications, past family history, past medical history, past social history, past surgical history and problem list.    Allergies   Allergen Reactions   • Adhesive Tape Rash   • Other Rash      Metals,Surgical Glue, swelled from boswell cath placement approx 9 years ago          Current Outpatient Prescriptions:   •  ARIPiprazole (ABILIFY) 15 MG tablet, Take 15 mg by mouth Daily., Disp: , Rfl:   •  buPROPion SR (WELLBUTRIN SR) 150 MG 12 hr tablet, Take 1 tablet by mouth 2 (Two) Times a Day., Disp: 60 tablet, Rfl: 12  •  carisoprodol (SOMA) 350 MG tablet, Take 350 mg by mouth 3 (Three) Times a Day As Needed for Muscle Spasms., Disp: , Rfl:   •  citalopram (CeleXA) 40 MG tablet, Take 40 mg by mouth Daily., Disp: , Rfl:   •  clonazePAM (KlonoPIN) 0.5 MG tablet, Take 0.5 mg by mouth 2 (Two) Times a Day As Needed for Anxiety., Disp: , Rfl:   •  esomeprazole (nexIUM) 40 MG capsule, Take 40 mg by mouth 2 (Two) Times a Day., Disp: , Rfl:   •  estradiol (CLIMARA) 0.1 MG/24HR patch, Place 1 patch on the skin as directed by provider 1 (One) Time Per Week., Disp: 4 patch, Rfl: 12  •  ferrous sulfate 324 (65 Fe) MG tablet delayed-release EC tablet, Take 324 mg by mouth Daily With Breakfast., Disp: , Rfl:   •  HYDROcodone-acetaminophen (NORCO)  MG per tablet, Take 1 tablet by mouth 3 (Three) Times a Day As Needed., Disp: , Rfl:   •  ibuprofen (ADVIL,MOTRIN) 800 MG tablet, Take 1 tablet by mouth Every 8 (Eight) Hours As Needed for Mild Pain ., Disp: 60 tablet, Rfl: 12  •  losartan (COZAAR) 50 MG tablet, Take 100 mg by mouth Daily., Disp: , Rfl:   •  vitamin D (ERGOCALCIFEROL) 72765 units capsule capsule, Take 50,000 Units by mouth Every 30 (Thirty) Days., Disp: , Rfl:      The patient has a family history of   Family History   Problem Relation Age of Onset   • Hypertension Father    • Diabetes Father    • Stroke Father    • Heart attack Father    • Diabetes Mother    • Malig Hyperthermia Mother    • Hypertension Mother    • Factor V Leiden deficiency Sister    • Lupus Sister    • Diabetes Sister         Past Medical History:   Diagnosis Date   • Abdominal pain     periods irregular   • Anemia    • Anxiety    •  Arthritis    • Asthma    • Bipolar 1 disorder (CMS/[facility])    • Depression    • Endometriosis    • Enlarged uterus 2018   • GERD (gastroesophageal reflux disease)    • H/O  section 2018   • Hypertension    • IBS (irritable bowel syndrome)    • Intramural, submucous, and subserous leiomyoma of uterus 2018   • Menometrorrhagia 2018   • Migraines    • Morbid obesity with BMI of 40.0-44.9, adult (CMS/[facility]) 2018   • Ovarian cyst    • Rash     pt relates that she breaks out on arms and lower back sometimes face   • Ulcer (CMS/[facility])         OB History      Para Term  AB Living    3 1 1   2      SAB TAB Ectopic Molar Multiple Live Births    2                     Social History     Social History   • Marital status:      Spouse name: N/A   • Number of children: N/A   • Years of education: N/A     Occupational History   • Not on file.     Social History Main Topics   • Smoking status: Former Smoker     Quit date: 2012   • Smokeless tobacco: Never Used   • Alcohol use No   • Drug use: No   • Sexual activity: Not on file     Other Topics Concern   • Not on file     Social History Narrative   • No narrative on file        Past Surgical History:   Procedure Laterality Date   • CARPAL TUNNEL RELEASE     •  SECTION     • CHOLECYSTECTOMY         • CUBITAL TUNNEL RELEASE Right    • ENDOSCOPIC FUNCTIONAL SINUS SURGERY (FESS)     • EXPLORATORY LAPAROTOMY N/A 2018    Procedure: LAPAROTOMY EXPLORATORY;  Surgeon: FridayDunia MD;  Location: [facility] LABOR DELIVERY;  Service: Obstetrics/Gynecology   • SHOULDER ROTATOR CUFF REPAIR Right 2016   • TOTAL ABDOMINAL HYSTERECTOMY WITH SALPINGO OOPHORECTOMY Bilateral 8/10/2018    Procedure: ABDOMINAL HYSTERECTOMY AND BILATERAL SALPINGO OOPHORECTOMY;  Surgeon: Orlando Starr MD;  Location: [facility] OR;  Service: Obstetrics/Gynecology   • UMBILICAL HERNIA REPAIR      Laparscopic        Patient Active Problem List  "  Diagnosis   • Endometriosis   • H/O  section   • Morbid obesity with BMI of 40.0-44.9, adult (CMS/HCC)   • Menometrorrhagia   • Enlarged uterus   • Intramural, submucous, and subserous leiomyoma of uterus   • Anemia   • Iron deficiency anemia due to chronic blood loss        Documented Vitals    18 1523   BP: 180/100   Weight: 125 kg (276 lb 6.4 oz)   Height: 165.1 cm (65\")   PainSc:   4   PainLoc: Incisional       Physical Exam   Constitutional: She is oriented to person, place, and time. No distress.   Obese white female weighing 276.4 pounds with BMI 46.0.     HENT:   Head: Normocephalic and atraumatic.   Eyes: Pupils are equal, round, and reactive to light. Conjunctivae and EOM are normal.   Neck: Normal range of motion. Neck supple. No JVD present. No tracheal deviation present. No thyromegaly present.   Cardiovascular: Normal rate, regular rhythm, normal heart sounds and intact distal pulses.  Exam reveals no gallop and no friction rub.    No murmur heard.  Pulmonary/Chest: Effort normal and breath sounds normal. No stridor. No respiratory distress. She has no wheezes. She has no rales. She exhibits no tenderness.   Abdominal: Soft. Bowel sounds are normal. She exhibits no distension and no mass. There is no tenderness. There is no rebound and no guarding. No hernia.   Skin with good closure.   Musculoskeletal: Normal range of motion. She exhibits no edema, tenderness or deformity.   Lymphadenopathy:     She has no cervical adenopathy.   Neurological: She is alert and oriented to person, place, and time. She has normal reflexes. She displays normal reflexes. No cranial nerve deficit. She exhibits normal muscle tone. Coordination normal.   Skin: Skin is warm and dry. No rash noted. She is not diaphoretic. No erythema. No pallor.   Psychiatric: She has a normal mood and affect. Her behavior is normal. Judgment and thought content normal.   Nursing note and vitals reviewed.    Assessment        " Diagnosis Plan   1. Postoperative visit           Plan      Follow-up in 3 weeks.  Follow-up sooner as needed.          This document has been electronically signed by Orlando Starr MD on October 20, 2018 7:29 PM

## 2018-10-24 NOTE — TELEPHONE ENCOUNTER
Pt needs refills on diltiazem, an asthma in [facility], and klonopin.    Her pharmacy is [facility] in [place].

## 2018-10-25 ENCOUNTER — TELEPHONE (OUTPATIENT)
Dept: FAMILY MEDICINE CLINIC | Facility: CLINIC | Age: 48
End: 2018-10-25

## 2018-10-25 NOTE — TELEPHONE ENCOUNTER
[facility] TOLD PATIENT PRESCRIPTIONS HAVE BEEN DENIED.   CLONOPIN,INHALER,DILATAZINE WERE DENIED. PATIENT WAS SEEN IN OFFICE 10/15/18

## 2018-10-26 ENCOUNTER — TELEPHONE (OUTPATIENT)
Dept: FAMILY MEDICINE CLINIC | Facility: CLINIC | Age: 48
End: 2018-10-26

## 2018-10-26 RX ORDER — ALBUTEROL SULFATE 90 UG/1
2 AEROSOL, METERED RESPIRATORY (INHALATION) EVERY 4 HOURS PRN
Qty: 1 INHALER | Refills: 5 | Status: SHIPPED | OUTPATIENT
Start: 2018-10-26 | End: 2018-10-28 | Stop reason: SDUPTHER

## 2018-10-26 RX ORDER — DILTIAZEM HYDROCHLORIDE 180 MG/1
180 CAPSULE, EXTENDED RELEASE ORAL DAILY
Qty: 90 CAPSULE | Refills: 2 | Status: SHIPPED | OUTPATIENT
Start: 2018-10-26 | End: 2018-10-28 | Stop reason: SDUPTHER

## 2018-10-26 RX ORDER — CLONAZEPAM 0.5 MG/1
0.5 TABLET ORAL 2 TIMES DAILY PRN
Qty: 60 TABLET | Refills: 1 | Status: SHIPPED | OUTPATIENT
Start: 2018-10-26 | End: 2018-10-28 | Stop reason: SDUPTHER

## 2018-10-28 RX ORDER — DILTIAZEM HYDROCHLORIDE 180 MG/1
180 CAPSULE, EXTENDED RELEASE ORAL DAILY
Qty: 90 CAPSULE | Refills: 2 | Status: SHIPPED | OUTPATIENT
Start: 2018-10-28

## 2018-10-28 RX ORDER — CLONAZEPAM 0.5 MG/1
0.5 TABLET ORAL 2 TIMES DAILY PRN
Qty: 60 TABLET | Refills: 1 | Status: SHIPPED | OUTPATIENT
Start: 2018-10-28

## 2018-10-28 RX ORDER — ALBUTEROL SULFATE 90 UG/1
2 AEROSOL, METERED RESPIRATORY (INHALATION) EVERY 4 HOURS PRN
Qty: 1 INHALER | Refills: 5 | Status: SHIPPED | OUTPATIENT
Start: 2018-10-28

## 2019-01-04 ENCOUNTER — TELEPHONE (OUTPATIENT)
Dept: FAMILY MEDICINE CLINIC | Facility: CLINIC | Age: 49
End: 2019-01-04

## 2019-01-04 NOTE — TELEPHONE ENCOUNTER
Jeni from [facility] in [place] was verifying Pt was no longer getting prescriptions with Dr Cronin because Pt has started seeing PCP there and did not want to duplicate medications.    Also making aware of the transfer of care in case Pt calls to do refills.    Thanks.

## 2019-05-16 RX ORDER — LOSARTAN POTASSIUM 100 MG/1
TABLET ORAL
Qty: 90 TABLET | Refills: 0 | OUTPATIENT
Start: 2019-05-16

## 2019-05-16 RX ORDER — ESOMEPRAZOLE MAGNESIUM 40 MG/1
CAPSULE, DELAYED RELEASE ORAL
Qty: 90 CAPSULE | Refills: 0 | OUTPATIENT
Start: 2019-05-16

## 2019-05-21 RX ORDER — LOSARTAN POTASSIUM 100 MG/1
TABLET ORAL
Qty: 90 TABLET | Refills: 0 | OUTPATIENT
Start: 2019-05-21

## 2019-05-21 RX ORDER — ESOMEPRAZOLE MAGNESIUM 40 MG/1
CAPSULE, DELAYED RELEASE ORAL
Qty: 90 CAPSULE | Refills: 0 | OUTPATIENT
Start: 2019-05-21

## 2019-05-22 NOTE — TELEPHONE ENCOUNTER
Patient will get a 30 day supply with no refills.  Have not seen patient since 10/15/18 however she has not completed a CPE since 11/20/17.  Please call and schedule this.

## 2019-05-23 RX ORDER — ESOMEPRAZOLE MAGNESIUM 40 MG/1
CAPSULE, DELAYED RELEASE ORAL
Qty: 30 CAPSULE | Refills: 0 | Status: SHIPPED | OUTPATIENT
Start: 2019-05-23 | End: 2019-07-14 | Stop reason: SDUPTHER

## 2019-05-23 RX ORDER — LOSARTAN POTASSIUM 100 MG/1
TABLET ORAL
Qty: 30 TABLET | Refills: 0 | Status: SHIPPED | OUTPATIENT
Start: 2019-05-23

## 2019-05-23 NOTE — TELEPHONE ENCOUNTER
Called number listed in chart, man answered phone. Stated that Pt is in bed. Asked that he give message for Pt to return phone call to our office. He stated he would give her the message.

## 2019-06-17 RX ORDER — ESOMEPRAZOLE MAGNESIUM 40 MG/1
CAPSULE, DELAYED RELEASE ORAL
Qty: 29 CAPSULE | Refills: 0 | OUTPATIENT
Start: 2019-06-17

## 2019-06-17 NOTE — TELEPHONE ENCOUNTER
Patient not seen since merge with [facility] and no paper chart to view. Patient needs appt.

## 2019-06-20 RX ORDER — ESOMEPRAZOLE MAGNESIUM 40 MG/1
CAPSULE, DELAYED RELEASE ORAL
Qty: 29 CAPSULE | Refills: 0 | OUTPATIENT
Start: 2019-06-20

## 2019-06-20 NOTE — TELEPHONE ENCOUNTER
Called Pt, she stated we are no longer her PCP and that she has switched physicians. She stated this was sent in error.

## 2019-07-15 RX ORDER — ESOMEPRAZOLE MAGNESIUM 40 MG/1
CAPSULE, DELAYED RELEASE ORAL
Qty: 30 CAPSULE | Refills: 0 | Status: SHIPPED | OUTPATIENT
Start: 2019-07-15 | End: 2019-08-12 | Stop reason: SDUPTHER

## 2019-07-16 RX ORDER — ESOMEPRAZOLE MAGNESIUM 40 MG/1
CAPSULE, DELAYED RELEASE ORAL
Qty: 29 CAPSULE | Refills: 0 | OUTPATIENT
Start: 2019-07-16

## 2019-07-22 RX ORDER — ESOMEPRAZOLE MAGNESIUM 40 MG/1
CAPSULE, DELAYED RELEASE ORAL
Qty: 29 CAPSULE | Refills: 0 | OUTPATIENT
Start: 2019-07-22

## 2019-07-29 RX ORDER — DILTIAZEM HYDROCHLORIDE 180 MG/1
CAPSULE, EXTENDED RELEASE ORAL
Qty: 90 CAPSULE | Refills: 1 | OUTPATIENT
Start: 2019-07-29

## 2019-08-12 RX ORDER — ESOMEPRAZOLE MAGNESIUM 40 MG/1
CAPSULE, DELAYED RELEASE ORAL
Qty: 29 CAPSULE | Refills: 0 | Status: SHIPPED | OUTPATIENT
Start: 2019-08-12

## 2019-08-19 RX ORDER — ESOMEPRAZOLE MAGNESIUM 40 MG/1
CAPSULE, DELAYED RELEASE ORAL
Qty: 90 CAPSULE | Refills: 1 | Status: SHIPPED | OUTPATIENT
Start: 2019-08-19

## 2019-08-19 RX ORDER — DILTIAZEM HYDROCHLORIDE 180 MG/1
CAPSULE, EXTENDED RELEASE ORAL
Qty: 90 CAPSULE | Refills: 1 | Status: SHIPPED | OUTPATIENT
Start: 2019-08-19

## 2019-09-11 RX ORDER — ESOMEPRAZOLE MAGNESIUM 40 MG/1
CAPSULE, DELAYED RELEASE ORAL
Qty: 90 CAPSULE | Refills: 1 | OUTPATIENT
Start: 2019-09-11

## 2019-11-06 NOTE — OP NOTE
OPERATIVE NOTE  Rylie Palumbo  1970  8/10/2018    PREOP DIAGNOSES:  Endometriosis [N80.9]  Iron deficiency anemia due to chronic blood loss [D50.0]  H/O  section [Z98.891]  Morbid obesity with BMI of 40.0-44.9, adult (CMS/[facility]) [E66.01, Z68.41]  Intramural, submucous, and subserous leiomyoma of uterus [D25.1, D25.0, D25.2]    POSTOP DIAGNOSES:  Post-Op Diagnosis Codes:     * Endometriosis [N80.9]     * Iron deficiency anemia due to chronic blood loss [D50.0]     * H/O  section [Z98.891]     * Morbid obesity with BMI of 40.0-44.9, adult (CMS/[facility]) [E66.01, Z68.41]     * Intramural, submucous, and subserous leiomyoma of uterus [D25.1, D25.0, D25.2]    PROCEDURE:      TOTAL ABDOMINAL HYSTERECTOMY AND BILATERAL SALPINGO OOPHORECTOMY    SURGEON: Orlando Starr MD, FACOG    RESIDENT SURGEON:  Vinnie Strickland DO, R3    ASSISTANT: Susana Newby CSA    STAFF:   Circulator: Shanita Powell RN; Rachael Woods RN  Scrub Person: Teresa Camargo  Vendor Representative: Johnny Da Silva  Assistant: Susana Newby CSA    ANESTHESIA: General    ANESTHESIA STAFF:  Anesthesiologist: Scott Camargo MD  CRNA: Pedro Byrne CRNA     ESTIMATED BLOOD LOSS: 300 ml     COMPLICATIONS: none    FINDINGS: Severe endometriosis with extensive pelvic adhesive disease.  Multiple fibroids.  Normal-appearing appendix.    DESCRIPTION OF OPERATION:    The patient was identified and brought to the operating room.  She underwent general endotracheal anesthesia per anesthesia protocol.  Kirkpatrick catheter was inserted.  Panniculus was elevated with the Traxi retractor.  She was prepped and draped in the usual sterile fashion.  A Time Out was performed.     A Pfannenstiel incision was made and carried sharply down  to the fascia which was scored in the midline and extended laterally.  Rectus muscles were bluntly  in the midline.  Peritoneal cavity was entered bluntly.  Self retaining retractor was inserted, and the bowel  Seen 05-15-19, ADHD, return in 6 months.  Appointment scheduled 11-14-19.  Last refills of Adderall XR yesterday, #30, 0 rf and Lexapro 04-03-19, #30, 11 rf.  Refills denied, too early.   was packed.  Adhesions of the omentum and epiploic appendages of the sigmoid colon were sharply resected as they were attached to the uterus and both adnexa. The uterus was elevated.    The left round ligament was clamped, cauterized, and cut with the bipolar device.  The left ureter was identified.  The left infundibulopelvic ligament was clamped, cauterized, and cut with the bipolar device thus removing the left tube and ovary.  The bladder was reflected inferiorly.  The left uterine artery was clamped, cauterized, and cut with the bipolar device.  The right round ligament was clamped, cauterized, and cut with the bipolar device.  The right ureter was identified.  The right infundibulopelvic ligament was clamped, cauterized, and cut with the the bipolar device thus removing the right tube and ovary.  The right uterine artery was clamped, cauterized, and cut with the bipolar device.  The bladder was further reflected inferiorly.  Straight clamps were placed on either side of the cervix.  These pedicles were cut and suture ligated.  This was continued to the apex of the vagina was reached.  Clamps were placed under the cervix at the apex of the vagina, and the uterus and cervix were sharply resected.  Vaginal cuff was closed with 0 Vicryl.    The area was copiously irrigated.  There was good hemostasis.  Rectus muscles were loosely approximated in the midline with chromic suture.  The fascia was closed with 0 Vicryl in a running fashion.  Subcutaneous tissue was irrigated.  Further hemostasis was obtained with electrocautery.  The skin was closed with 4-0 nylon vertical mattress sutures.  Bandage was applied.  Patient was awakened from general anesthesia and transferred to the recovery room in stable condition.  Sponge, lap, and needle counts were correct.  Estimated blood loss was 300 mL.             This document has been electronically signed by Orlando Starr MD on August 10, 2018 11:53 AM

## 2022-03-14 ENCOUNTER — TRANSCRIBE ORDERS (OUTPATIENT)
Dept: ADMINISTRATIVE | Facility: HOSPITAL | Age: 52
End: 2022-03-14

## 2022-03-14 DIAGNOSIS — M54.12 BRACHIAL NEURITIS: Primary | ICD-10-CM

## 2023-01-26 ENCOUNTER — TELEPHONE (OUTPATIENT)
Dept: ENDOCRINOLOGY | Facility: CLINIC | Age: 53
End: 2023-01-26
Payer: MEDICARE

## (undated) DEVICE — GLV SURG SENSICARE POLYISPRN W/ALOE PF LF 6.5 GRN STRL

## (undated) DEVICE — SUT ETHLN 4/0 FS2 18IN 662H

## (undated) DEVICE — SUT VICRYL 1 CT1 27IN J261H

## (undated) DEVICE — GLV SURG SENSICARE GREEN W/ALOE PF LF 6.5 STRL

## (undated) DEVICE — SPNG LAP 18X18IN LF STRL PK/5

## (undated) DEVICE — PK MAJ PROC LF 60

## (undated) DEVICE — PAD,ABDOMINAL,8"X10",ST,LF: Brand: MEDLINE

## (undated) DEVICE — WOUND RETRACTOR AND PROTECTOR: Brand: ALEXIS O WOUND PROTECTOR-RETRACTOR

## (undated) DEVICE — SUT GUT CHRM 2/0 CT1 36IN 923H

## (undated) DEVICE — GLV SURG SENSICARE GREEN W/ALOE PF LF 7 STRL

## (undated) DEVICE — SUT VICRYL O M0-4 27IN ETHCPP71D

## (undated) DEVICE — DRSNG TELFA PAD NONADH STR 1S 3X8IN

## (undated) DEVICE — GOWN,AURORA,NOREINF,RAGLAN,XL,STERILE: Brand: MEDLINE

## (undated) DEVICE — DRAPE,LAPAROSCOPY,GUSS,STERILE: Brand: MEDLINE

## (undated) DEVICE — SUT VICRYL 2-0  X-1 27IN ETVCP459H PLS

## (undated) DEVICE — SPNG GZ WOVN 4X4IN 12PLY 10/BX STRL

## (undated) DEVICE — CVR HNDL LT SURG ACCSSRY BLU STRL

## (undated) DEVICE — TP SXN YANKR BLB TIP W/TBG 10F LF STRL

## (undated) DEVICE — 1314 FOAM STRIP NEEDLE COUNTER: Brand: DEVON

## (undated) DEVICE — TOTAL TRAY, 16FR 10ML SIL FOLEY, URN: Brand: MEDLINE

## (undated) DEVICE — ELECTRD BLD STD SS 3/32X6.5IN

## (undated) DEVICE — PAD GRND REM POLYHESIVE A/ DISP

## (undated) DEVICE — SOL ANTISEP SCRB CHG 4PCT 16OZ

## (undated) DEVICE — TOWEL,OR,DSP,ST,BLUE,DLX,4/PK,20PK/CS: Brand: MEDLINE

## (undated) DEVICE — GLV SURG TRIUMPH LT PF LTX 6.5 STRL

## (undated) DEVICE — SUT  GUT PLAIN 2/0 CT1 27IN 843H

## (undated) DEVICE — INTENDED FOR TISSUE SEPARATION, AND OTHER PROCEDURES THAT REQUIRE A SHARP SURGICAL BLADE TO PUNCTURE OR CUT.: Brand: BARD-PARKER ® CARBON RIB-BACK BLADES

## (undated) DEVICE — YANKAUER SUCTION INSTRUMENT WITH ON/OFF CONTROL, BULB TIP, CLEAR: Brand: YANKAUER

## (undated) DEVICE — Device

## (undated) DEVICE — PENCL E/S HNDSWCH PUSHBTN HOLSTR 10FT

## (undated) DEVICE — SUT ETHLN 4/0 30IN 626H

## (undated) DEVICE — GLV SURG SENSICARE PI PF LF 7 GRN STRL

## (undated) DEVICE — ANTIBACTERIAL UNDYED BRAIDED (POLYGLACTIN 910), SYNTHETIC ABSORBABLE SUTURE: Brand: COATED VICRYL

## (undated) DEVICE — SUT VIC 0 CTX 36IN J978H

## (undated) DEVICE — GLV SURG SENSICARE POLYISPRN W/ALOE PF LF 6 GRN STRL

## (undated) DEVICE — SOL IRR H2O BTL 1000ML STRL

## (undated) DEVICE — SUT MONOCRYL PLS ANTIB UND 3/0  PS1 27IN

## (undated) DEVICE — SOL IRR NACL 0.9PCT BT 1000ML

## (undated) DEVICE — ENSEAL 20 CM SHAFT, LARGE JAW: Brand: ENSEAL X1

## (undated) DEVICE — APPL CHLORAPREP W/TINT 26ML ORNG